# Patient Record
Sex: FEMALE | Race: BLACK OR AFRICAN AMERICAN | Employment: FULL TIME | URBAN - METROPOLITAN AREA
[De-identification: names, ages, dates, MRNs, and addresses within clinical notes are randomized per-mention and may not be internally consistent; named-entity substitution may affect disease eponyms.]

---

## 2017-04-20 ENCOUNTER — HOSPITAL ENCOUNTER (OUTPATIENT)
Dept: LAB | Age: 67
Discharge: HOME | End: 2017-04-20

## 2017-04-20 ENCOUNTER — OFFICE VISIT (OUTPATIENT)
Dept: INTERNAL MEDICINE CLINIC | Age: 67
End: 2017-04-20

## 2017-04-20 VITALS
HEART RATE: 64 BPM | RESPIRATION RATE: 16 BRPM | HEIGHT: 64 IN | WEIGHT: 141.2 LBS | BODY MASS INDEX: 24.11 KG/M2 | SYSTOLIC BLOOD PRESSURE: 122 MMHG | OXYGEN SATURATION: 100 % | TEMPERATURE: 97 F | DIASTOLIC BLOOD PRESSURE: 72 MMHG

## 2017-04-20 DIAGNOSIS — R10.30 LOWER ABDOMINAL PAIN: Primary | ICD-10-CM

## 2017-04-20 PROCEDURE — 99001 SPECIMEN HANDLING PT-LAB: CPT | Performed by: INTERNAL MEDICINE

## 2017-04-20 RX ORDER — BISACODYL 5 MG
5 TABLET, DELAYED RELEASE (ENTERIC COATED) ORAL DAILY PRN
COMMUNITY
End: 2019-04-04

## 2017-04-20 NOTE — PROGRESS NOTES
ROOM # 1    Macho Metcalf presents today for   Chief Complaint   Patient presents with    Abdominal Pain     sore lower abd only in the AM. neg bloody/black tarry stools, or diarrhea. Poss constipation, no urge to have BM.  Nail Problem     fingernail changes, brittle       Joaquin Lofton preferred language for health care discussion is english/other. Is someone accompanying this pt? no    Is the patient using any DME equipment during OV? no    Depression Screening:  PHQ 2 / 9, over the last two weeks 4/20/2017 12/1/2016 7/7/2015 5/13/2014   Little interest or pleasure in doing things Not at all Not at all Not at all Several days   Feeling down, depressed or hopeless Not at all Not at all Not at all Several days   Total Score PHQ 2 0 0 0 2       Learning Assessment:  Learning Assessment 7/7/2015 12/3/2013   PRIMARY LEARNER Patient Patient   HIGHEST LEVEL OF EDUCATION - PRIMARY LEARNER  2 [address] LEARNER NONE -   [address] CAREGIVER No -   PRIMARY LANGUAGE ENGLISH ENGLISH   LEARNER PREFERENCE PRIMARY DEMONSTRATION READING   ANSWERED BY Patient Patient   RELATIONSHIP SELF SELF       Abuse Screening:  Abuse Screening Questionnaire 9/17/2015   Do you ever feel afraid of your partner? N   Are you in a relationship with someone who physically or mentally threatens you? N   Is it safe for you to go home? Y       Fall Risk  Fall Risk Assessment, last 12 mths 4/20/2017 12/1/2016 7/7/2015   Able to walk? Yes Yes Yes   Fall in past 12 months? No No No       Health Maintenance reviewed and discussed per provider. Yes    Macho Metcalf is due for DTAP vax, FOBT, pneumonia vax, medicare yearly exam.  Please order/place referral if appropriate. Advance Directive:  1. Do you have an advance directive in place? Patient Reply: no    2. If not, would you like material regarding how to put one in place? Patient Reply: no    Coordination of Care:  1.  Have you been to the ER, urgent care clinic since your last visit? Hospitalized since your last visit? no    2. Have you seen or consulted any other health care providers outside of the [facility] since your last visit? Include any pap smears or colon screening.  no

## 2017-04-20 NOTE — MR AVS SNAPSHOT
Visit Information Date & Time Provider Department Dept. Phone Encounter #  
 4/20/2017  9:30 AM Betsy Mason MD [address] [phone] [id number] Upcoming Health Maintenance Date Due DTaP/Tdap/Td series (1 - Tdap) 2/10/1971 FOBT Q 1 YEAR AGE 50-75 9/30/2015 MEDICARE YEARLY EXAM 7/7/2016 Pneumococcal 65+ High/Highest Risk (2 of 2 - PCV13) 3/9/2017 GLAUCOMA SCREENING Q2Y 5/20/2017 BREAST CANCER SCRN MAMMOGRAM 4/8/2018 Allergies as of 4/20/2017  Review Complete On: 4/20/2017 By: Betsy Mason MD  
 No Known Allergies Current Immunizations  Reviewed on 3/9/2016 Name Date Pneumococcal Polysaccharide (PPSV-23) 3/9/2016 Not reviewed this visit You Were Diagnosed With   
  
 Codes Comments Lower abdominal pain    -  Primary ICD-10-CM: R10.30 ICD-9-CM: 789.09 Vitals BP Pulse Temp Resp Height(growth percentile) Weight(growth percentile) 122/72 (BP 1 Location: Right arm, BP Patient Position: Sitting) 64 97 °F (36.1 °C) (Oral) 16 5' 4\" (1.626 m) 141 lb 3.2 oz (64 kg) SpO2 BMI OB Status Smoking Status 100% 24.24 kg/m2 Postmenopausal Never Smoker Vitals History BMI and BSA Data Body Mass Index Body Surface Area  
 24.24 kg/m 2 1.7 m 2 Preferred Pharmacy Pharmacy Name Phone [address] [address] [address] [place] [address] [phone] Your Updated Medication List  
  
   
This list is accurate as of: 4/20/17  9:53 AM.  Always use your most recent med list.  
  
  
  
  
 Calcium-Cholecalciferol (D3) 600 mg(1,500mg) -400 unit Cap Take  by mouth. Take one po bid   Indications: new suppliment with calcium, vit d, and zinc  
  
 DULCOLAX (BISACODYL) 5 mg EC tablet Generic drug:  bisacodyl Take 5 mg by mouth daily as needed for Constipation (2 pills daily). To-Do List   
 04/20/2017   Lab:  CBC WITH AUTOMATED DIFF   
  
 04/20/2017 Lab:  METABOLIC PANEL, COMPREHENSIVE   
  
 04/20/2017 Lab:  URINALYSIS W/ RFLX MICROSCOPIC   
  
 04/20/2017 Imaging:  XR ABD FLAT/ ERECT Patient Instructions 1) can use laxative as needed for constipation 2) take multivitamin daily 3) follow-up as needed or sooner if worsening symptoms. Introducing [facility]! [employer] introduces [employer] patient portal. Now you can access parts of your medical record, email your doctor's office, and request medication refills online. 1. In your internet browser, go to [email or web address] 2. Click on the First Time User? Click Here link in the Sign In box. You will see the New Member Sign Up page. 3. Enter your HashParade Access Code exactly as it appears below. You will not need to use this code after youve completed the sign-up process. If you do not sign up before the expiration date, you must request a new code. · HashParade Access Code: [email or web address] Expires: 7/19/2017  9:14 AM 
 
4. Enter the last four digits of your Social Security Number (xxxx) and Date of Birth (mm/dd/yyyy) as indicated and click Submit. You will be taken to the next sign-up page. 5. Create a HashParade ID. This will be your HashParade login ID and cannot be changed, so think of one that is secure and easy to remember. 6. Create a [employer]Parade password. You can change your password at any time. 7. Enter your Password Reset Question and Answer. This can be used at a later time if you forget your password. 8. Enter your e-mail address. You will receive e-mail notification when new information is available in [address] 9. Click Sign Up. You can now view and download portions of your medical record. 10. Click the Download Summary menu link to download a portable copy of your medical information.  
 
If you have questions, please visit the Frequently Asked Questions section of the [employer]. Remember, [employer] is NOT to be used for urgent needs. For medical emergencies, dial 911. Now available from your iPhone and Android! Please provide this summary of care documentation to your next provider. Your primary care clinician is listed as Mariangel Sigala. If you have any questions after today's visit, please call [phone].

## 2017-04-20 NOTE — PROGRESS NOTES
Chief Complaint   Patient presents with    Abdominal Pain     sore lower abd only in the AM. neg bloody/black tarry stools, or diarrhea. Poss constipation, no urge to have BM.  Nail Problem     fingernail changes, brittle       HPI:     Codey Shaikh is a 79 y.o.  female with history of  Dyslipidemia and vitamin D deficiency here for the above complaint. She is having lower abdominal pain  And it is only in the AM. She said it is a soreness. She denies any dysuria, hematuria, increase urgency/frequency. She is not constipated. She said her BM are normal, but 2 weeks ago was like michelle. She is currently taking a laxative. She is eating a lot of fiber and drinking a lot or water. She said it is in the AM when she gets up, but then once the day starts the pain is gone. No nausea or vomiting. No blood or black tarry stools. Brittle fingernails: this has been going on for 3 months. Past Medical History:   Diagnosis Date    Breast cancer [facility]) 2004    left, Dr. Dalton Kidney [facility])     left breast cancer    Fibroid uterus     Other and unspecified hyperlipidemia 9/2014    Vitamin D deficiency 9/2014     Past Surgical History:   Procedure Laterality Date    BREAST SURGERY PROCEDURE UNLISTED  2004    left breast mastectomy, no LN    HX HYSTERECTOMY  1998    TAHBSO    HX OTHER SURGICAL  5/2015    repair on retina hole by Dr. Corine Blankenship     Current Outpatient Prescriptions   Medication Sig    bisacodyl (DULCOLAX, BISACODYL,) 5 mg EC tablet Take 5 mg by mouth daily as needed for Constipation (2 pills daily).  Calcium-Cholecalciferol, D3, 600 mg(1,500mg) -400 unit cap Take  by mouth. Take one po bid   Indications: new suppliment with calcium, vit d, and zinc     No current facility-administered medications for this visit.       Health Maintenance   Topic Date Due    DTaP/Tdap/Td series (1 - Tdap) 02/10/1971    FOBT Q 1 YEAR AGE 50-75  09/30/2015    MEDICARE YEARLY EXAM 07/07/2016    Pneumococcal 65+ High/Highest Risk (2 of 2 - PCV13) 03/09/2017    GLAUCOMA SCREENING Q2Y  05/20/2017    BREAST CANCER SCRN MAMMOGRAM  04/08/2018    Hepatitis C Screening  Addressed    OSTEOPOROSIS SCREENING (DEXA)  Completed    ZOSTER VACCINE AGE 60>  Addressed    INFLUENZA AGE 9 TO ADULT  Addressed     Immunization History   Administered Date(s) Administered    Pneumococcal Polysaccharide (PPSV-23) 03/09/2016     No LMP recorded. Patient is postmenopausal.        Allergies and Intolerances:   No Known Allergies    Family History:   Family History   Problem Relation Age of Onset    Hypertension Mother     Diabetes Father     Diabetes Maternal Grandmother        Social History:   She  reports that she has never smoked. She has never used smokeless tobacco.  She  reports that she does not drink alcohol. ·     OBJECTIVE:   Physical exam:   Visit Vitals    /72 (BP 1 Location: Right arm, BP Patient Position: Sitting)    Pulse 64    Temp 97 °F (36.1 °C) (Oral)    Resp 16    Ht 5' 4\" (1.626 m)    Wt 141 lb 3.2 oz (64 kg)    SpO2 100%    BMI 24.24 kg/m2        Generally: Pleasant female in no acute distress  Cardiac Exam: regular, rate, and rhythm. Normal S1 and S2. No murmurs, gallops, or rubs  Pulmonary exam: Clear to auscultation bilaterally  Abdominal exam: Positive bowel sounds in all four quadrants, soft, nondistended, nontender  Extremities: 2+ dorsalis pedis pulses bilaterally.  No pedal edema    bilaterally  Nails bilaterally of fingers: brittle  LABS/RADIOLOGICAL TESTS:  Lab Results   Component Value Date/Time    WBC 5.8 06/10/2015 11:10 PM    HGB 12.8 06/10/2015 11:10 PM    HCT 40.3 06/10/2015 11:10 PM    PLATELET 086 02/35/5859 11:10 PM     Lab Results   Component Value Date/Time    Sodium 142 06/10/2015 11:10 PM    Potassium 4.3 06/10/2015 11:10 PM    Chloride 107 06/10/2015 11:10 PM    CO2 29 06/10/2015 11:10 PM    Glucose 93 06/10/2015 11:10 PM    BUN 10 06/10/2015 11:10 PM    Creatinine 0.82 06/10/2015 11:10 PM     Lab Results   Component Value Date/Time    Cholesterol, total 191 07/07/2015 09:46 AM    HDL Cholesterol 50 07/07/2015 09:46 AM    LDL, calculated 129 07/07/2015 09:46 AM    Triglyceride 61 07/07/2015 09:46 AM     No results found for: GPT  Previous labs    ASSESSMENT/PLAN:    1. Lower abdominal pain  -     CBC WITH AUTOMATED DIFF; Future  -     METABOLIC PANEL, COMPREHENSIVE; Future  -     URINALYSIS W/ RFLX MICROSCOPIC; Future  -     XR ABD FLAT/ ERECT; Future  -     CBC WITH AUTOMATED DIFF  -     METABOLIC PANEL, COMPREHENSIVE  -     URINALYSIS W/ RFLX MICROSCOPIC    2. Brittle nails: take multivitamin daily    3. Patient verbalized understanding and agreement with the plan. 4. Patient was given an after-visit summary. 5.   Follow-up Disposition:  Return if symptoms worsen or fail to improve. or sooner if worsening symptoms.           Ahmet Perez MD

## 2017-04-20 NOTE — PATIENT INSTRUCTIONS
1) can use laxative as needed for constipation    2) take multivitamin daily       3) follow-up as needed or sooner if worsening symptoms.

## 2017-04-21 ENCOUNTER — TELEPHONE (OUTPATIENT)
Dept: INTERNAL MEDICINE CLINIC | Age: 67
End: 2017-04-21

## 2017-04-21 DIAGNOSIS — R82.90 ABNORMAL URINE: Primary | ICD-10-CM

## 2017-04-21 LAB
ALBUMIN SERPL-MCNC: 4.4 G/DL (ref 3.6–4.8)
ALBUMIN/GLOB SERPL: 1.7 {RATIO} (ref 1.2–2.2)
ALP SERPL-CCNC: 66 IU/L (ref 39–117)
ALT SERPL-CCNC: 7 IU/L (ref 0–32)
APPEARANCE UR: CLEAR
AST SERPL-CCNC: 13 IU/L (ref 0–40)
BACTERIA #/AREA URNS HPF: ABNORMAL /[HPF]
BASOPHILS # BLD AUTO: 0 X10E3/UL (ref 0–0.2)
BASOPHILS NFR BLD AUTO: 0 %
BILIRUB SERPL-MCNC: 0.8 MG/DL (ref 0–1.2)
BILIRUB UR QL STRIP: NEGATIVE
BUN SERPL-MCNC: 12 MG/DL (ref 8–27)
BUN/CREAT SERPL: 17 (ref 12–28)
CALCIUM SERPL-MCNC: 9.2 MG/DL (ref 8.7–10.3)
CASTS URNS QL MICRO: ABNORMAL /LPF
CHLORIDE SERPL-SCNC: 104 MMOL/L (ref 96–106)
CO2 SERPL-SCNC: 27 MMOL/L (ref 18–29)
COLOR UR: YELLOW
CREAT SERPL-MCNC: 0.7 MG/DL (ref 0.57–1)
EOSINOPHIL # BLD AUTO: 0.1 X10E3/UL (ref 0–0.4)
EOSINOPHIL NFR BLD AUTO: 2 %
EPI CELLS #/AREA URNS HPF: ABNORMAL /HPF
ERYTHROCYTE [DISTWIDTH] IN BLOOD BY AUTOMATED COUNT: 15.5 % (ref 12.3–15.4)
GLOBULIN SER CALC-MCNC: 2.6 G/DL (ref 1.5–4.5)
GLUCOSE SERPL-MCNC: 100 MG/DL (ref 65–99)
GLUCOSE UR QL: NEGATIVE
HCT VFR BLD AUTO: 37.5 % (ref 34–46.6)
HGB BLD-MCNC: 12.3 G/DL (ref 11.1–15.9)
HGB UR QL STRIP: NEGATIVE
IMM GRANULOCYTES # BLD: 0 X10E3/UL (ref 0–0.1)
IMM GRANULOCYTES NFR BLD: 0 %
KETONES UR QL STRIP: NEGATIVE
LEUKOCYTE ESTERASE UR QL STRIP: ABNORMAL
LYMPHOCYTES # BLD AUTO: 1.3 X10E3/UL (ref 0.7–3.1)
LYMPHOCYTES NFR BLD AUTO: 27 %
MCH RBC QN AUTO: 25.7 PG (ref 26.6–33)
MCHC RBC AUTO-ENTMCNC: 32.8 G/DL (ref 31.5–35.7)
MCV RBC AUTO: 79 FL (ref 79–97)
MICRO URNS: ABNORMAL
MONOCYTES # BLD AUTO: 0.3 X10E3/UL (ref 0.1–0.9)
MONOCYTES NFR BLD AUTO: 6 %
MUCOUS THREADS URNS QL MICRO: PRESENT
NEUTROPHILS # BLD AUTO: 3.1 X10E3/UL (ref 1.4–7)
NEUTROPHILS NFR BLD AUTO: 65 %
NITRITE UR QL STRIP: NEGATIVE
PH UR STRIP: 5.5 [PH] (ref 5–7.5)
PLATELET # BLD AUTO: 254 X10E3/UL (ref 150–379)
POTASSIUM SERPL-SCNC: 4.4 MMOL/L (ref 3.5–5.2)
PROT SERPL-MCNC: 7 G/DL (ref 6–8.5)
PROT UR QL STRIP: NEGATIVE
RBC # BLD AUTO: 4.78 X10E6/UL (ref 3.77–5.28)
RBC #/AREA URNS HPF: ABNORMAL /HPF
SODIUM SERPL-SCNC: 144 MMOL/L (ref 134–144)
SP GR UR: 1.02 (ref 1–1.03)
UROBILINOGEN UR STRIP-MCNC: 0.2 MG/DL (ref 0.2–1)
WBC # BLD AUTO: 4.8 X10E3/UL (ref 3.4–10.8)
WBC #/AREA URNS HPF: ABNORMAL /HPF

## 2017-04-21 RX ORDER — CIPROFLOXACIN 500 MG/1
500 TABLET ORAL 2 TIMES DAILY
Qty: 6 TAB | Refills: 0 | Status: SHIPPED | OUTPATIENT
Start: 2017-04-21 | End: 2017-04-24

## 2017-04-21 NOTE — TELEPHONE ENCOUNTER
----- Message from Bg Fraser MD sent at 4/21/2017  3:56 PM EDT -----  Please let pt know that labs were normal except:    1) lot's of WBC and few bacteria. This maybe the cause of her lower abdominal pain. Will send electronically cipro 500mg one po bid x  3 days #6 no refills. She will need to come back to give urine ctx. Order in [facility]. 2) AXR negative.

## 2017-04-21 NOTE — TELEPHONE ENCOUNTER
Spoke with patient, confirmed name and . Advised patient of lab results, requested specimen, and ordered medication, per Dr. Dayanna Thompson. Patient verbalized understanding, stated that she will be in tomorrow to leave her sample.      Be advised

## 2017-04-21 NOTE — TELEPHONE ENCOUNTER
Attempted to contact pt at  number, no answer. Lvm for pt to return call to office at [phone]. Will continue to try to contact pt.

## 2017-04-21 NOTE — PROGRESS NOTES
Please let pt know that labs were normal except:    1) lot's of WBC and few bacteria. This maybe the cause of her lower abdominal pain. Will send electronically cipro 500mg one po bid x  3 days #6 no refills. She will need to come back to give urine ctx. Order in [facility] care. 2) AXR negative.

## 2017-04-22 ENCOUNTER — LAB ONLY (OUTPATIENT)
Dept: INTERNAL MEDICINE CLINIC | Age: 67
End: 2017-04-22

## 2017-04-22 DIAGNOSIS — R82.90 ABNORMAL URINE: ICD-10-CM

## 2017-04-24 LAB — BACTERIA UR CULT: NORMAL

## 2017-05-17 ENCOUNTER — OFFICE VISIT (OUTPATIENT)
Dept: INTERNAL MEDICINE CLINIC | Age: 67
End: 2017-05-17

## 2017-05-17 ENCOUNTER — HOSPITAL ENCOUNTER (OUTPATIENT)
Dept: LAB | Age: 67
Discharge: HOME | End: 2017-05-17

## 2017-05-17 VITALS
RESPIRATION RATE: 16 BRPM | DIASTOLIC BLOOD PRESSURE: 70 MMHG | TEMPERATURE: 97.1 F | HEART RATE: 65 BPM | BODY MASS INDEX: 23.93 KG/M2 | WEIGHT: 140.2 LBS | HEIGHT: 64 IN | OXYGEN SATURATION: 99 % | SYSTOLIC BLOOD PRESSURE: 117 MMHG

## 2017-05-17 DIAGNOSIS — R53.1 WEAKNESS: Primary | ICD-10-CM

## 2017-05-17 DIAGNOSIS — Z12.11 COLON CANCER SCREENING: ICD-10-CM

## 2017-05-17 DIAGNOSIS — R30.0 DYSURIA: ICD-10-CM

## 2017-05-17 DIAGNOSIS — Z23 ENCOUNTER FOR IMMUNIZATION: ICD-10-CM

## 2017-05-17 DIAGNOSIS — R11.0 NAUSEA: ICD-10-CM

## 2017-05-17 DIAGNOSIS — Z23 NEED FOR TDAP VACCINATION: ICD-10-CM

## 2017-05-17 DIAGNOSIS — R73.01 ELEVATED FASTING GLUCOSE: ICD-10-CM

## 2017-05-17 LAB
BILIRUB UR QL STRIP: NEGATIVE
GLUCOSE UR-MCNC: NEGATIVE MG/DL
KETONES P FAST UR STRIP-MCNC: NEGATIVE MG/DL
PH UR STRIP: 7 [PH] (ref 4.6–8)
PROT UR QL STRIP: NEGATIVE MG/DL
SP GR UR STRIP: 1.02 (ref 1–1.03)
UA UROBILINOGEN AMB POC: NORMAL (ref 0.2–1)
URINALYSIS CLARITY POC: NORMAL
URINALYSIS COLOR POC: YELLOW
URINE BLOOD POC: NEGATIVE
URINE LEUKOCYTES POC: NORMAL
URINE NITRITES POC: NEGATIVE

## 2017-05-17 PROCEDURE — 99001 SPECIMEN HANDLING PT-LAB: CPT | Performed by: INTERNAL MEDICINE

## 2017-05-17 RX ORDER — CIPROFLOXACIN 500 MG/1
500 TABLET ORAL 2 TIMES DAILY
Qty: 14 TAB | Refills: 0 | Status: SHIPPED | OUTPATIENT
Start: 2017-05-17 | End: 2017-05-24

## 2017-05-17 NOTE — PROGRESS NOTES
Chief Complaint   Patient presents with    Dehydration     [facility] f/u  r/t diarrhea which has since resolved.  Blood sugar problem     urgent care f/u       HPI:     Jennifer Gilliam is a 79 y.o.  female with history of  Breast cancer and dyslipidemia  here for the above complaint. [facility] to [facility] in [address] [place] [address] on 5/12/17 for weakness and diarrhea. They checked her fingerstick sugar and it was 126. She thinks it was from taking a multivitamin on empty stomach. She had 2 episodes when she took multivitamin on empty stomach and diarrhea. She refused do any other work up at that urgent care. Dysuria: This started yesterday and today. She denies any fevers, chills, night sweats, abdominal pain, headaches, dizziness, back pain, hematuria. She has urinary frequency and urgency. Past Medical History:   Diagnosis Date    Breast cancer [facility]) 2004    left, Dr. Nikki Huggins [facility])     left breast cancer    Fibroid uterus     Other and unspecified hyperlipidemia 9/2014    Vitamin D deficiency 9/2014     Past Surgical History:   Procedure Laterality Date    BREAST SURGERY PROCEDURE UNLISTED  2004    left breast mastectomy, no LN    HX HYSTERECTOMY  1998    TAHBSO    HX OTHER SURGICAL  5/2015    repair on retina hole by Dr. Silviano Wright     Current Outpatient Prescriptions   Medication Sig    diph,Pertuss,Acell,,Tet Vac-PF (ADACEL) 2 Lf-(2.5-5-3-5 mcg)-5Lf/0.5 mL susp 0.5 mL by IntraMUSCular route once for 1 dose.  ciprofloxacin HCl (CIPRO) 500 mg tablet Take 1 Tab by mouth two (2) times a day for 7 days.  Calcium-Cholecalciferol, D3, 600 mg(1,500mg) -400 unit cap Take  by mouth. Take one po bid   Indications: new suppliment with calcium, vit d, and zinc    bisacodyl (DULCOLAX, BISACODYL,) 5 mg EC tablet Take 5 mg by mouth daily as needed for Constipation (2 pills daily). No current facility-administered medications for this visit. Health Maintenance   Topic Date Due    FOBT Q 1 YEAR AGE 50-75  09/30/2015    MEDICARE YEARLY EXAM  07/07/2016    GLAUCOMA SCREENING Q2Y  05/20/2017    DTaP/Tdap/Td series (1 - Tdap) 12/22/2017 (Originally 2/10/1971)    INFLUENZA AGE 9 TO ADULT  08/01/2017    BREAST CANCER SCRN MAMMOGRAM  04/08/2018    Hepatitis C Screening  Addressed    OSTEOPOROSIS SCREENING (DEXA)  Completed    ZOSTER VACCINE AGE 60>  Addressed    Pneumococcal 65+ High/Highest Risk  Completed     Immunization History   Administered Date(s) Administered    Pneumococcal Conjugate (PCV-13) 05/17/2017    Pneumococcal Polysaccharide (PPSV-23) 03/09/2016     No LMP recorded. Patient is postmenopausal.        Allergies and Intolerances:   No Known Allergies    Family History:   Family History   Problem Relation Age of Onset    Hypertension Mother     Diabetes Father     Diabetes Maternal Grandmother        Social History:   She  reports that she has never smoked. She has never used smokeless tobacco.  She  reports that she does not drink alcohol. OBJECTIVE:   Physical exam:   Visit Vitals    /70 (BP 1 Location: Right arm, BP Patient Position: Sitting)    Pulse 65    Temp 97.1 °F (36.2 °C) (Oral)    Resp 16    Ht 5' 4\" (1.626 m)    Wt 140 lb 3.2 oz (63.6 kg)    SpO2 99%    BMI 24.07 kg/m2        Generally: Pleasant female in no acute distress  Cardiac Exam: regular, rate, and rhythm. Normal S1 and S2. No murmurs, gallops, or rubs  Pulmonary exam: Clear to auscultation bilaterally  Abdominal exam: Positive bowel sounds in all four quadrants, soft, nondistended, Diffuse abdominal tenderness especially in the lower abdominal area. Extremities: 2+ dorsalis pedis pulses bilaterally.  No pedal edema    bilaterally    LABS/RADIOLOGICAL TESTS:  Lab Results   Component Value Date/Time    WBC 4.8 04/20/2017 09:58 AM    HGB 12.3 04/20/2017 09:58 AM    HCT 37.5 04/20/2017 09:58 AM    PLATELET 133 94/74/0768 09:58 AM Lab Results   Component Value Date/Time    Sodium 144 04/20/2017 09:58 AM    Potassium 4.4 04/20/2017 09:58 AM    Chloride 104 04/20/2017 09:58 AM    CO2 27 04/20/2017 09:58 AM    Glucose 100 04/20/2017 09:58 AM    BUN 12 04/20/2017 09:58 AM    Creatinine 0.70 04/20/2017 09:58 AM     Lab Results   Component Value Date/Time    Cholesterol, total 191 07/07/2015 09:46 AM    HDL Cholesterol 50 07/07/2015 09:46 AM    LDL, calculated 129 07/07/2015 09:46 AM    Triglyceride 61 07/07/2015 09:46 AM     No results found for: GPT    Previous labs  Component      Latest Ref Rng & Units 5/17/2017           8:16 AM   Color (UA POC)       Yellow   Clarity (UA POC)       Cloudy   Glucose (UA POC)      Negative Negative   Bilirubin (UA POC)      Negative Negative   Ketones (UA POC)      Negative Negative   Specific gravity (UA POC)      1.001 - 1.035 1.020   Blood (UA POC)      Negative Negative   pH (UA POC)      4.6 - 8.0 7.0   Protein (UA POC)      Negative mg/dL Negative   Urobilinogen (UA POC)      0.2 - 1 0.2 mg/dL   Nitrites (UA POC)      Negative Negative   Leukocyte esterase (UA POC)      Negative 1+   Pt is notified of results at [address] today. ASSESSMENT/PLAN:    1. Weakness: resolved. 2. Nausea: resolved    3. Elevated fasting glucose: will check HgA1C  -     HEMOGLOBIN A1C WITH EAG; Future  -     HEMOGLOBIN A1C WITH EAG    4. Dysuria  -     AMB POC URINALYSIS DIP STICK AUTO W/O MICRO  -     CULTURE, URINE; Future  -     ciprofloxacin HCl (CIPRO) 500 mg tablet; Take 1 Tab by mouth two (2) times a day for 7 days. -     CULTURE, URINE    5. Encounter for immunization  -     PNEUMOCOCCAL CONJ VACCINE 13 VALENT IM (Age 48 and over)    10. Colon cancer screening  -     REFERRAL TO GASTROENTEROLOGY    7. Need for Tdap vaccination  -     diph,Pertuss,Acell,,Tet Vac-PF (ADACEL) 2 Lf-(2.5-5-3-5 mcg)-5Lf/0.5 mL susp; 0.5 mL by IntraMUSCular route once for 1 dose.     8.   Requested Prescriptions     Signed Prescriptions Disp Refills    diph,Pertuss,Acell,,Tet Vac-PF (ADACEL) 2 Lf-(2.5-5-3-5 mcg)-5Lf/0.5 mL susp 1 Syringe 0     Si.5 mL by IntraMUSCular route once for 1 dose.  ciprofloxacin HCl (CIPRO) 500 mg tablet 14 Tab 0     Sig: Take 1 Tab by mouth two (2) times a day for 7 days. 9. Patient verbalized understanding and agreement with the plan. 10. Patient was given an after-visit summary. 11.   Follow-up Disposition:  Return if symptoms worsen or fail to improve. or sooner if worsening symptoms.           Kirstin Membreno MD

## 2017-05-17 NOTE — MR AVS SNAPSHOT
Visit Information Date & Time Provider Department Dept. Phone Encounter #  
 5/17/2017  7:30 AM Sebastián Ruzi MD [facility] [phone] [id number] Upcoming Health Maintenance Date Due FOBT Q 1 YEAR AGE 50-75 9/30/2015 MEDICARE YEARLY EXAM 7/7/2016 GLAUCOMA SCREENING Q2Y 5/20/2017 DTaP/Tdap/Td series (1 - Tdap) 12/22/2017* INFLUENZA AGE 9 TO ADULT 8/1/2017 BREAST CANCER SCRN MAMMOGRAM 4/8/2018 *Topic was postponed. The date shown is not the original due date. Allergies as of 5/17/2017  Review Complete On: 5/17/2017 By: Sebastián Ruiz MD  
 No Known Allergies Current Immunizations  Reviewed on 5/17/2017 Name Date Pneumococcal Conjugate (PCV-13) 5/17/2017 Pneumococcal Polysaccharide (PPSV-23) 3/9/2016 Reviewed by Cole Em LPN on 8/98/9654 at  8:18 AM  
You Were Diagnosed With   
  
 Codes Comments Weakness    -  Primary ICD-10-CM: R53.1 ICD-9-CM: 780.79 Nausea     ICD-10-CM: R11.0 ICD-9-CM: 787.02 Elevated fasting glucose     ICD-10-CM: R73.01 
ICD-9-CM: 790.21 Dysuria     ICD-10-CM: R30.0 ICD-9-CM: 788.1 Encounter for immunization     ICD-10-CM: P10 ICD-9-CM: V03.89 Colon cancer screening     ICD-10-CM: Z12.11 ICD-9-CM: V76.51 Need for Tdap vaccination     ICD-10-CM: R78 ICD-9-CM: V06.1 Vitals BP Pulse Temp Resp Height(growth percentile) Weight(growth percentile) 117/70 (BP 1 Location: Right arm, BP Patient Position: Sitting) 65 97.1 °F (36.2 °C) (Oral) 16 5' 4\" (1.626 m) 140 lb 3.2 oz (63.6 kg) SpO2 BMI OB Status Smoking Status 99% 24.07 kg/m2 Postmenopausal Never Smoker Vitals History BMI and BSA Data Body Mass Index Body Surface Area 24.07 kg/m 2 1.69 m 2 Preferred Pharmacy Pharmacy Name Phone [address] [facility], [address] [address] [address] [facility] [phone] Your Updated Medication List  
  
 This list is accurate as of: 17  8:19 AM.  Always use your most recent med list.  
  
  
  
  
 Calcium-Cholecalciferol (D3) 600 mg(1,500mg) -400 unit Cap Take  by mouth. Take one po bid   Indications: new suppliment with calcium, vit d, and zinc  
  
 ciprofloxacin HCl 500 mg tablet Commonly known as:  CIPRO Take 1 Tab by mouth two (2) times a day for 7 days. diph,Pertuss(Acell),Tet Vac-PF 2 Lf-(2.5-5-3-5 mcg)-5Lf/0.5 mL susp Commonly known as:  ADACEL  
0.5 mL by IntraMUSCular route once for 1 dose. DULCOLAX (BISACODYL) 5 mg EC tablet Generic drug:  bisacodyl Take 5 mg by mouth daily as needed for Constipation (2 pills daily). Prescriptions Printed Refills diph,Pertuss,Acell,,Tet Vac-PF (ADACEL) 2 Lf-(2.5-5-3-5 mcg)-5Lf/0.5 mL susp 0 Si.5 mL by IntraMUSCular route once for 1 dose. Class: Print Route: IntraMUSCular Prescriptions Sent to Pharmacy Refills  
 ciprofloxacin HCl (CIPRO) 500 mg tablet 0 Sig: Take 1 Tab by mouth two (2) times a day for 7 days. Class: Normal  
 Pharmacy: [address] [place] [address], [address] [place] [address] #: [phone] Route: Oral  
  
We Performed the Following AMB POC URINALYSIS DIP STICK AUTO W/O MICRO [54728 CPT(R)] PNEUMOCOCCAL CONJ VACCINE 13 VALENT IM [id number] CPT(R)] REFERRAL TO GASTROENTEROLOGY [[facility] Custom] Comments:  
 Please evaluate patient for screening colonoscopy in 1-2 weeks. To-Do List   
 2017 Microbiology:  CULTURE, URINE   
  
 2017 Lab:  HEMOGLOBIN A1C WITH EAG Referral Information Referral ID Referred By Referred To  
  
 [id number] [facility] [address] [place] [address], [address] [place] [address] Road Phone: [phone] Fax: [phone] Visits Status Start Date End Date 1 New Request 17 If your referral has a status of pending review or denied, additional information will be sent to support the outcome of this decision. Patient Instructions 1) follow-up as needed or sooner if worsening symptoms. 2) keep hydrated with water. Urinary Tract Infection in Women: Care Instructions Your Care Instructions A urinary tract infection, or UTI, is a general term for an infection anywhere between the kidneys and the urethra (where urine comes out). Most UTIs are bladder infections. They often cause pain or burning when you urinate. UTIs are caused by bacteria and can be cured with antibiotics. Be sure to complete your treatment so that the infection goes away. Follow-up care is a key part of your treatment and safety. Be sure to make and go to all appointments, and call your doctor if you are having problems. It's also a good idea to know your test results and keep a list of the medicines you take. How can you care for yourself at home? · Take your antibiotics as directed. Do not stop taking them just because you feel better. You need to take the full course of antibiotics. · Drink extra water and other fluids for the next day or two. This may help wash out the bacteria that are causing the infection. (If you have kidney, heart, or liver disease and have to limit fluids, talk with your doctor before you increase your fluid intake.) · Avoid drinks that are carbonated or have caffeine. They can irritate the bladder. · Urinate often. Try to empty your bladder each time. · To relieve pain, take a hot bath or lay a heating pad set on low over your lower belly or genital area. Never go to sleep with a heating pad in place. To prevent UTIs · Drink plenty of water each day. This helps you urinate often, which clears bacteria from your system. (If you have kidney, heart, or liver disease and have to limit fluids, talk with your doctor before you increase your fluid intake.) · Urinate when you need to. · Urinate right after you have sex. · Change sanitary pads often. · Avoid douches, bubble baths, feminine hygiene sprays, and other feminine hygiene products that have deodorants. · After going to the bathroom, wipe from front to back. When should you call for help? Call your doctor now or seek immediate medical care if: · Symptoms such as fever, chills, nausea, or vomiting get worse or appear for the first time. · You have new pain in your back just below your rib cage. This is called flank pain. · There is new blood or pus in your urine. · You have any problems with your antibiotic medicine. Watch closely for changes in your health, and be sure to contact your doctor if: 
· You are not getting better after taking an antibiotic for 2 days. · Your symptoms go away but then come back. Where can you learn more? Go to http://servando-grisel.info/. Enter T971 in the search box to learn more about \"Urinary Tract Infection in Women: Care Instructions. \" Current as of: November 28, 2016 Content Version: 11.2 © 2851-4482 [employer]Fire. Care instructions adapted under license by [employer] (which disclaims liability or warranty for this information). If you have questions about a medical condition or this instruction, always ask your healthcare professional. [address] any warranty or liability for your use of this information. Introducing [facility]! [employer] introduces iCrumz patient portal. Now you can access parts of your medical record, email your doctor's office, and request medication refills online. 1. In your internet browser, go to [email or web address] 2. Click on the First Time User? Click Here link in the Sign In box. You will see the New Member Sign Up page. 3. Enter your iCrumz Access Code exactly as it appears below.  You will not need to use this code after youve completed the sign-up process. If you do not sign up before the expiration date, you must request a new code. · [employer] Access Code: [email or web address] Expires: 7/19/2017  9:14 AM 
 
4. Enter the last four digits of your Social Security Number (xxxx) and Date of Birth (mm/dd/yyyy) as indicated and click Submit. You will be taken to the next sign-up page. 5. Create a [employer] ID. This will be your [employer] login ID and cannot be changed, so think of one that is secure and easy to remember. 6. Create a [employer] password. You can change your password at any time. 7. Enter your Password Reset Question and Answer. This can be used at a later time if you forget your password. 8. Enter your e-mail address. You will receive e-mail notification when new information is available in [address] 9. Click Sign Up. You can now view and download portions of your medical record. 10. Click the Download Summary menu link to download a portable copy of your medical information. If you have questions, please visit the Frequently Asked Questions section of the [employer] website. Remember, [employer] is NOT to be used for urgent needs. For medical emergencies, dial 911. Now available from your iPhone and Android! Please provide this summary of care documentation to your next provider. Your primary care clinician is listed as Mariangel Sigala. If you have any questions after today's visit, please call [phone].

## 2017-05-17 NOTE — PATIENT INSTRUCTIONS
1) follow-up as needed or sooner if worsening symptoms. 2) keep hydrated with water. Urinary Tract Infection in Women: Care Instructions  Your Care Instructions    A urinary tract infection, or UTI, is a general term for an infection anywhere between the kidneys and the urethra (where urine comes out). Most UTIs are bladder infections. They often cause pain or burning when you urinate. UTIs are caused by bacteria and can be cured with antibiotics. Be sure to complete your treatment so that the infection goes away. Follow-up care is a key part of your treatment and safety. Be sure to make and go to all appointments, and call your doctor if you are having problems. It's also a good idea to know your test results and keep a list of the medicines you take. How can you care for yourself at home? · Take your antibiotics as directed. Do not stop taking them just because you feel better. You need to take the full course of antibiotics. · Drink extra water and other fluids for the next day or two. This may help wash out the bacteria that are causing the infection. (If you have kidney, heart, or liver disease and have to limit fluids, talk with your doctor before you increase your fluid intake.)  · Avoid drinks that are carbonated or have caffeine. They can irritate the bladder. · Urinate often. Try to empty your bladder each time. · To relieve pain, take a hot bath or lay a heating pad set on low over your lower belly or genital area. Never go to sleep with a heating pad in place. To prevent UTIs  · Drink plenty of water each day. This helps you urinate often, which clears bacteria from your system. (If you have kidney, heart, or liver disease and have to limit fluids, talk with your doctor before you increase your fluid intake.)  · Urinate when you need to. · Urinate right after you have sex. · Change sanitary pads often.   · Avoid douches, bubble baths, feminine hygiene sprays, and other feminine hygiene products that have deodorants. · After going to the bathroom, wipe from front to back. When should you call for help? Call your doctor now or seek immediate medical care if:  · Symptoms such as fever, chills, nausea, or vomiting get worse or appear for the first time. · You have new pain in your back just below your rib cage. This is called flank pain. · There is new blood or pus in your urine. · You have any problems with your antibiotic medicine. Watch closely for changes in your health, and be sure to contact your doctor if:  · You are not getting better after taking an antibiotic for 2 days. · Your symptoms go away but then come back. Where can you learn more? Go to http://servando-grisel.info/. Enter X709 in the search box to learn more about \"Urinary Tract Infection in Women: Care Instructions. \"  Current as of: November 28, 2016  Content Version: 11.2  © 2227-4132 BestSecret.[employer]. Care instructions adapted under license by [employer] (which disclaims liability or warranty for this information). If you have questions about a medical condition or this instruction, always ask your healthcare professional. [address] any warranty or liability for your use of this information.

## 2017-05-17 NOTE — PROGRESS NOTES
ROOM # 3    Shelby Mcdanielite presents today for   Chief Complaint   Patient presents with    Dehydration     [facility] f/u  r/t diarrhea which has since resolved.  Blood sugar problem     urgent care f/u       Shelby Mcdanielite preferred language for health care discussion is english/other. Is someone accompanying this pt? no    Is the patient using any DME equipment during OV? no    Depression Screening:  PHQ over the last two weeks 5/17/2017 4/20/2017 12/1/2016 7/7/2015 5/13/2014   Little interest or pleasure in doing things Not at all Not at all Not at all Not at all Several days   Feeling down, depressed or hopeless Not at all Not at all Not at all Not at all Several days   Total Score PHQ 2 0 0 0 0 2       Learning Assessment:  Learning Assessment 7/7/2015 12/3/2013   PRIMARY LEARNER Patient Patient   HIGHEST LEVEL OF EDUCATION - PRIMARY LEARNER  [address] LEARNER NONE -   [address] CAREGIVER No -   PRIMARY LANGUAGE ENGLISH ENGLISH   LEARNER PREFERENCE PRIMARY DEMONSTRATION READING   ANSWERED BY Patient Patient   RELATIONSHIP SELF SELF       Abuse Screening:  Abuse Screening Questionnaire 9/17/2015   Do you ever feel afraid of your partner? N   Are you in a relationship with someone who physically or mentally threatens you? N   Is it safe for you to go home? Y       Fall Risk  Fall Risk Assessment, last 12 mths 5/17/2017 4/20/2017 12/1/2016 7/7/2015   Able to walk? Yes Yes Yes Yes   Fall in past 12 months? No No No No       Health Maintenance reviewed and discussed per provider. Yes    Shelby Favorite is due for DTAP vax, FOBT, medicare yearly exam, pneumonia vax . Please order/place referral if appropriate. Advance Directive:  1. Do you have an advance directive in place? Patient Reply: no    2. If not, would you like material regarding how to put one in place? Patient Reply: no    Coordination of Care:  1.  Have you been to the ER, urgent care clinic since your last visit? Hospitalized since your last visit? [facility] for hypoglycemia and dehydration r/t diarrhea    2. Have you seen or consulted any other health care providers outside of the [address] [place] [address] since your last visit? Include any pap smears or colon screening.  no

## 2017-05-19 LAB
BACTERIA UR CULT: NORMAL
EST. AVERAGE GLUCOSE BLD GHB EST-MCNC: 111 MG/DL
HBA1C MFR BLD: 5.5 % (ref 4.8–5.6)

## 2017-05-22 DIAGNOSIS — Z12.31 ENCOUNTER FOR SCREENING MAMMOGRAM FOR BREAST CANCER: Primary | ICD-10-CM

## 2017-07-05 ENCOUNTER — ANESTHESIA EVENT (OUTPATIENT)
Dept: ENDOSCOPY | Age: 67
End: 2017-07-05
Payer: COMMERCIAL

## 2017-07-06 ENCOUNTER — HOSPITAL ENCOUNTER (OUTPATIENT)
Age: 67
Setting detail: OUTPATIENT SURGERY
Discharge: HOME | End: 2017-07-06
Attending: INTERNAL MEDICINE | Admitting: INTERNAL MEDICINE
Payer: COMMERCIAL

## 2017-07-06 ENCOUNTER — ANESTHESIA (OUTPATIENT)
Dept: ENDOSCOPY | Age: 67
End: 2017-07-06
Payer: COMMERCIAL

## 2017-07-06 VITALS
RESPIRATION RATE: 16 BRPM | BODY MASS INDEX: 23.31 KG/M2 | HEIGHT: 64 IN | WEIGHT: 136.5 LBS | DIASTOLIC BLOOD PRESSURE: 55 MMHG | HEART RATE: 53 BPM | OXYGEN SATURATION: 100 % | SYSTOLIC BLOOD PRESSURE: 98 MMHG | TEMPERATURE: 97 F

## 2017-07-06 PROBLEM — R10.30 LOWER ABDOMINAL PAIN: Status: ACTIVE | Noted: 2017-07-06

## 2017-07-06 LAB
ATRIAL RATE: 53 BPM
CALCULATED P AXIS, ECG09: 49 DEGREES
CALCULATED R AXIS, ECG10: 5 DEGREES
CALCULATED T AXIS, ECG11: 45 DEGREES
DIAGNOSIS, 93000: NORMAL
P-R INTERVAL, ECG05: 152 MS
Q-T INTERVAL, ECG07: 428 MS
QRS DURATION, ECG06: 82 MS
QTC CALCULATION (BEZET), ECG08: 401 MS
VENTRICULAR RATE, ECG03: 53 BPM

## 2017-07-06 PROCEDURE — 76060000032 HC ANESTHESIA 0.5 TO 1 HR: Performed by: INTERNAL MEDICINE

## 2017-07-06 PROCEDURE — 74011250637 HC RX REV CODE- 250/637: Performed by: NURSE ANESTHETIST, CERTIFIED REGISTERED

## 2017-07-06 PROCEDURE — 77030031670 HC DEV INFL ENDOTEK BIG60 MRTM -B: Performed by: INTERNAL MEDICINE

## 2017-07-06 PROCEDURE — 74011250636 HC RX REV CODE- 250/636: Performed by: NURSE ANESTHETIST, CERTIFIED REGISTERED

## 2017-07-06 PROCEDURE — 76040000007: Performed by: INTERNAL MEDICINE

## 2017-07-06 PROCEDURE — 74011250636 HC RX REV CODE- 250/636

## 2017-07-06 PROCEDURE — 88305 TISSUE EXAM BY PATHOLOGIST: CPT | Performed by: INTERNAL MEDICINE

## 2017-07-06 PROCEDURE — 93005 ELECTROCARDIOGRAM TRACING: CPT

## 2017-07-06 PROCEDURE — 74011000250 HC RX REV CODE- 250

## 2017-07-06 RX ORDER — SODIUM CHLORIDE, SODIUM LACTATE, POTASSIUM CHLORIDE, CALCIUM CHLORIDE 600; 310; 30; 20 MG/100ML; MG/100ML; MG/100ML; MG/100ML
75 INJECTION, SOLUTION INTRAVENOUS CONTINUOUS
Status: DISCONTINUED | OUTPATIENT
Start: 2017-07-07 | End: 2017-07-06 | Stop reason: HOSPADM

## 2017-07-06 RX ORDER — SODIUM CHLORIDE 9 MG/ML
25 INJECTION, SOLUTION INTRAVENOUS CONTINUOUS
Status: CANCELLED | OUTPATIENT
Start: 2017-07-06 | End: 2017-07-06

## 2017-07-06 RX ORDER — FAMOTIDINE 20 MG/1
20 TABLET, FILM COATED ORAL ONCE
Status: COMPLETED | OUTPATIENT
Start: 2017-07-06 | End: 2017-07-06

## 2017-07-06 RX ORDER — SODIUM CHLORIDE 0.9 % (FLUSH) 0.9 %
5-10 SYRINGE (ML) INJECTION AS NEEDED
Status: CANCELLED | OUTPATIENT
Start: 2017-07-06 | End: 2017-07-06

## 2017-07-06 RX ORDER — ONDANSETRON 2 MG/ML
4 INJECTION INTRAMUSCULAR; INTRAVENOUS
Status: CANCELLED | OUTPATIENT
Start: 2017-07-06

## 2017-07-06 RX ORDER — SODIUM CHLORIDE 0.9 % (FLUSH) 0.9 %
5-10 SYRINGE (ML) INJECTION EVERY 8 HOURS
Status: CANCELLED | OUTPATIENT
Start: 2017-07-06 | End: 2017-07-06

## 2017-07-06 RX ORDER — PROPOFOL 10 MG/ML
INJECTION, EMULSION INTRAVENOUS AS NEEDED
Status: DISCONTINUED | OUTPATIENT
Start: 2017-07-06 | End: 2017-07-06 | Stop reason: HOSPADM

## 2017-07-06 RX ORDER — LIDOCAINE HYDROCHLORIDE 20 MG/ML
INJECTION, SOLUTION EPIDURAL; INFILTRATION; INTRACAUDAL; PERINEURAL AS NEEDED
Status: DISCONTINUED | OUTPATIENT
Start: 2017-07-06 | End: 2017-07-06 | Stop reason: HOSPADM

## 2017-07-06 RX ORDER — SODIUM CHLORIDE, SODIUM LACTATE, POTASSIUM CHLORIDE, CALCIUM CHLORIDE 600; 310; 30; 20 MG/100ML; MG/100ML; MG/100ML; MG/100ML
50 INJECTION, SOLUTION INTRAVENOUS CONTINUOUS
Status: CANCELLED | OUTPATIENT
Start: 2017-07-06

## 2017-07-06 RX ADMIN — FAMOTIDINE 20 MG: 20 TABLET ORAL at 10:00

## 2017-07-06 RX ADMIN — PROPOFOL 50 MG: 10 INJECTION, EMULSION INTRAVENOUS at 10:59

## 2017-07-06 RX ADMIN — PROPOFOL 50 MG: 10 INJECTION, EMULSION INTRAVENOUS at 11:03

## 2017-07-06 RX ADMIN — SODIUM CHLORIDE, SODIUM LACTATE, POTASSIUM CHLORIDE, AND CALCIUM CHLORIDE 75 ML/HR: 600; 310; 30; 20 INJECTION, SOLUTION INTRAVENOUS at 09:58

## 2017-07-06 RX ADMIN — PROPOFOL 50 MG: 10 INJECTION, EMULSION INTRAVENOUS at 10:58

## 2017-07-06 RX ADMIN — LIDOCAINE HYDROCHLORIDE 40 MG: 20 INJECTION, SOLUTION EPIDURAL; INFILTRATION; INTRACAUDAL; PERINEURAL at 10:58

## 2017-07-06 NOTE — DISCHARGE INSTRUCTIONS
COLONOSCOPY DISCHARGE INSTRUCTIONS    You have just had an examination of your colon (large intestine). The medication given to you during your procedure will be acting in your body for the next 12 hours, gradually wearing off. Your face may be flushed, skin may be warm and sweaty, you might feel a little bloated or nauseated and sleepy. Please be aware of the followin. For the next 12 hours you SHOULD NOT:    a. Drive, Operate any machinery. b. Drink alcohol.  c. Engage in activities that require mental sharpness or manual dexterity such as cooking. d. Take any medications other than prescribed by a physician.  e. Make any legal or financial decisions. 2. Call this office immediately, if:    a. Onset of new or increase of baldemar rectal bleeding.  b. Fever > 101  c. Increased abdominal pain    Additional instructions:    a. Diet as recommended  b. Due to risk of dehydration after colon prep and sedation, avoid extended periods of time outdoors if the day is hot and humid. c. If biopsies were taken, you will receive a post card or telephone call with results of your biopsies and suggestion for your next colonoscopy. Please allow us at least 3 weeks to get back with you about your results. If we have not contacted you by the, please call us for results. # [phone]  d. If you had polyp(s) removed DO NOT TAKE NSAIDS (Aspirin, Advil, Aleve, Naproxyn, Ibuprofen, etc) for 2 weeks. Tylenol is OK to use. Colonoscopy: What to Expect at [address] [place] [address]  After you have a colonoscopy, you will stay at the clinic for 1 to 2 hours until the medicines wear off. Then you can go home. But you will need to arrange for a ride. Your doctor will tell you when you can eat and do your other usual activities. Your doctor will talk to you about when you will need your next colonoscopy. Your doctor can help you decide how often you need to be checked.  This will depend on the results of your test and your risk for colorectal cancer. After the test, you may be bloated or have gas pains. You may need to pass gas. If a biopsy was done or a polyp was removed, you may have streaks of blood in your stool (feces) for a few days. This care sheet gives you a general idea about how long it will take for you to recover. But each person recovers at a different pace. Follow the steps below to get better as quickly as possible. How can you care for yourself at home? Activity  · Rest when you feel tired. · You can do your normal activities when it feels okay to do so. Diet  · Follow your doctor's directions for eating. · Unless your doctor has told you not to, drink plenty of fluids. This helps to replace the fluids that were lost during the colon prep. · Do not drink alcohol. Medicines  · Your doctor will tell you if and when you can restart your medicines. He or she will also give you instructions about taking any new medicines. · If you take blood thinners, such as warfarin (Coumadin), clopidogrel (Plavix), or aspirin, be sure to talk to your doctor. He or she will tell you if and when to start taking those medicines again. Make sure that you understand exactly what your doctor wants you to do. · If polyps were removed or a biopsy was done during the test, your doctor may tell you not to take aspirin or other anti-inflammatory medicines for a few days. These include ibuprofen (Advil, Motrin) and naproxen (Aleve). Other instructions  · For your safety, do not drive or operate machinery until the medicine wears off and you can think clearly. Your doctor may tell you not to drive or operate machinery until the day after your test.  · Do not sign legal documents or make major decisions until the medicine wears off and you can think clearly. The anesthesia can make it hard for you to fully understand what you are agreeing to. Follow-up care is a key part of your treatment and safety.  Be sure to make and go to all appointments, and call your doctor if you are having problems. It's also a good idea to know your test results and keep a list of the medicines you take. When should you call for help? Call 911 anytime you think you may need emergency care. For example, call if:  · You passed out (lost consciousness). · You pass maroon or bloody stools. · You have severe belly pain. Call your doctor now or seek immediate medical care if:  · Your stools are black and tarlike. · Your stools have streaks of blood, but you did not have a biopsy or any polyps removed. · You have belly pain, or your belly is swollen and firm. · You vomit. · You have a fever. · You are very dizzy. Watch closely for changes in your health, and be sure to contact your doctor if you have any problems. Where can you learn more? Go to http://servando-grisel.info/. Enter E264 in the search box to learn more about \"Colonoscopy: What to Expect at Home. \"  Current as of: August 9, 2016  Content Version: 11.3  © 3201-3412 [employer], [employer]. Care instructions adapted under license by [employer] (which disclaims liability or warranty for this information). If you have questions about a medical condition or this instruction, always ask your healthcare professional. [address] any warranty or liability for your use of this information. Patient armband removed and given to patient to take home.   Patient was informed of the privacy risks if armband lost or stolen

## 2017-07-06 NOTE — ANESTHESIA POSTPROCEDURE EVALUATION
Post-Anesthesia Evaluation & Assessment    Visit Vitals    BP (!) 86/49    Pulse (!) 53    Temp 36.1 °C (97 °F)    Resp 16    Ht 5' 4\" (1.626 m)    Wt 61.9 kg (136 lb 8 oz)    SpO2 100%    BMI 23.43 kg/m2       Nausea/Vomiting: no nausea    Post-operative hydration adequate.     Pain score (VAS): 0    Mental status & Level of consciousness: alert and oriented x 3    Neurological status: moves all extremities, sensation grossly intact    Pulmonary status: airway patent, no supplemental oxygen required    Complications related to anesthesia: none    Additional comments:

## 2017-07-06 NOTE — ANESTHESIA PREPROCEDURE EVALUATION
Anesthetic History   No history of anesthetic complications            Review of Systems / Medical History  Patient summary reviewed and pertinent labs reviewed    Pulmonary  Within defined limits                 Neuro/Psych   Within defined limits           Cardiovascular  Within defined limits                Exercise tolerance: >4 METS     GI/Hepatic/Renal  Within defined limits              Endo/Other  Within defined limits           Other Findings   Comments:   Risk Factors for Postoperative nausea/vomiting:       History of postoperative nausea/vomiting? NO       Female? YES       Motion sickness? NO       Intended opioid administration for postoperative analgesia? NO      Smoking Abstinence  Current Smoker? NO  Elective Surgery? YES  Seen preoperatively by anesthesiologist or proxy prior to day of surgery? YES  Pt abstained from smoking 24 hours prior to anesthesia?  N/A           Physical Exam    Airway  Mallampati: II  TM Distance: 4 - 6 cm  Neck ROM: normal range of motion   Mouth opening: Normal     Cardiovascular  Regular rate and rhythm,  S1 and S2 normal,  no murmur, click, rub, or gallop  Rhythm: regular  Rate: normal         Dental    Dentition: Lower dentition intact and Upper dentition intact     Pulmonary  Breath sounds clear to auscultation               Abdominal  GI exam deferred       Other Findings            Anesthetic Plan    ASA: 2  Anesthesia type: general          Induction: Intravenous  Anesthetic plan and risks discussed with: Patient

## 2017-07-06 NOTE — H&P
Date of Surgery Update:  Brendan Lincoln was seen and examined. History and physical has been reviewed. The patient has been examined.  There have been no significant clinical changes since the completion of the originally dated History and Physical.    Signed By: Mary Bui MD     July 6, 2017 9:23 AM

## 2017-07-06 NOTE — PROCEDURES
Colonoscopy Procedure Note    Patient: Wendi Blankenship MRN: [id number]  SSN: [id number]    YOB: 1950  Age: 79 y.o. Sex: female      Date of Procedure: 7/6/2017      Procedures:  COLONOSCOPY:   HISTORY UPDATE: History and physical has been reviewed. The patient has been examined. There have been no significant clinical changes since the completion of the originally dated History and Physical.   INDICATION:  Lower abdominal pain    PROCEDURE PERFORMED:Colonoscopy was complete to cecum. ENDOSCOPIST: Lidia Espana MD   ASSISTANT:  Endoscopy Technician-1: Aida Norris  Endoscopy RN-1: Delfin Beasley RN   CLASSIFICATION OF PREOPERATIVE RISK: ASA 2 - Patient with mild systemic disease with no functional limitations   ANESTHESIA:  MAC anesthesia   ENDOSCOPE: [id number]                                                                                                        EXTENT OF EXAM: Cecum. QUALITY OF COLON PREPARATION:  good     DESCRIPTION OF PROCEDURE:  The procedure was discussed with the     patient including but not limited to IV conscious sedation, bleeding, perforation and missed polyps and the consent form was signed and witnessed. A safety timeout was performed. Procedure done with MAC. The patient's vitals were monitored at all times, including heart rate and rhythm, oxygen saturation, and blood pressure. The patient was then placed into the left lateral decubitus position. A rectal exam was performed. The Olympus Adult diagnostic colonscope was then passed under direct visualization with ease to the cecum. .  The cecum was identified by landmarks including Ileocecal valve, appendiceal orifice and crow's foot. The scope was then slowly withdrawn while closely visualizing the mucosa in the rectum a retroflection was performed and distal rectum visualized. The scope was then removed.   The patient was transferred to the recovery room in stable condition. FINDINGS:1. Two 2-3 mm sessile polyps in the cecum removed with cold biopsy forceps 2. Mild diverticulosis seen on the right side of the colon 3. Small internal hemorrhoids on retroflexion. EBL: None   SPECIMENS:   ID Type Source Tests Collected by Time Destination   1 : bx polyp Preservative Cecum  Christ Adams MD 7/6/2017 1107 Pathology      IMPRESSION: 1. Two 2-3 mm sessile polyps in the cecum removed with cold biopsy forceps 2. Mild diverticulosis seen on the right side of the colon 3. Small internal hemorrhoids on retroflexion. RECOMMENDATIONS:               1.   Further recommendations will be made based on polyp pathology reuslts                2.  High fibre diet                  Follow Up:   As scheduled.        Reyes Chough, MD   7/6/2017

## 2017-07-06 NOTE — IP AVS SNAPSHOT
[address] [address] Ellie Stormy Mckeon 
[phone] Patient: Douglas Burkitt MRN: [id number] FMM:9/38/1974 You are allergic to the following No active allergies Recent Documentation Height Weight BMI OB Status Smoking Status 1.626 m 61.9 kg 23.43 kg/m2 Postmenopausal Never Smoker Emergency Contacts Name Discharge Info Relation Home Work Mobile Lise Kauffman DISCHARGE CAREGIVER [3] Daughter [21] [phone] About your hospitalization You were admitted on:  July 6, 2017 You last received care in the:  [facility] PHASE 2 RECOVERY You were discharged on:  July 6, 2017 Unit phone number:  [phone] Why you were hospitalized Your primary diagnosis was: Lower Abdominal Pain Providers Seen During Your Hospitalizations Provider Role Specialty Primary office phone Narda Willis MD Attending Provider Gastroenterology [phone] Your Primary Care Physician (PCP) Primary Care Physician Office Phone Office Fax 1500 [facility], [address] [place] [address] Road [phone] Follow-up Information Follow up With Details Comments Contact Info Oralia Orosco MD   [address] [id number] [phone] Marsha Adams MD In 2 months  [address] [place] [address] 
[address] 2520 [facility] [address] [phone] Current Discharge Medication List  
  
CONTINUE these medications which have NOT CHANGED Dose & Instructions Dispensing Information Comments Morning Noon Evening Bedtime Calcium-Cholecalciferol (D3) 600 mg(1,500mg) -400 unit Cap Your last dose was: Your next dose is: Take  by mouth. Take one po bid   Indications: new suppliment with calcium, vit d, and zinc  
 Refills:  0 DULCOLAX (BISACODYL) 5 mg EC tablet Generic drug:  bisacodyl Your last dose was: Your next dose is:    
   
   
 Dose:  5 mg Take 5 mg by mouth daily as needed for Constipation (2 pills daily). Refills:  0 Discharge Instructions COLONOSCOPY DISCHARGE INSTRUCTIONS You have just had an examination of your colon (large intestine). The medication given to you during your procedure will be acting in your body for the next 12 hours, gradually wearing off. Your face may be flushed, skin may be warm and sweaty, you might feel a little bloated or nauseated and sleepy. Please be aware of the followin. For the next 12 hours you SHOULD NOT: 
 
a. Drive, Operate any machinery. b. Drink alcohol. 
c. Engage in activities that require mental sharpness or manual dexterity such as cooking. d. Take any medications other than prescribed by a physician. 
e. Make any legal or financial decisions. 2. Call this office immediately, if: 
 
a. Onset of new or increase of baldemar rectal bleeding. 
b. Fever > 101 
c. Increased abdominal pain Additional instructions: 
 
a. Diet as recommended 
b. Due to risk of dehydration after colon prep and sedation, avoid extended periods of time outdoors if the day is hot and humid. c. If biopsies were taken, you will receive a post card or telephone call with results of your biopsies and suggestion for your next colonoscopy. Please allow us at least 3 weeks to get back with you about your results. If we have not contacted you by the, please call us for results. # [phone] 
d. If you had polyp(s) removed DO NOT TAKE NSAIDS (Aspirin, Advil, Aleve, Naproxyn, Ibuprofen, etc) for 2 weeks. Tylenol is OK to use. Colonoscopy: What to Expect at [facility] Your Recovery After you have a colonoscopy, you will stay at the clinic for 1 to 2 hours until the medicines wear off. Then you can go home.  But you will need to arrange for a ride. Your doctor will tell you when you can eat and do your other usual activities. Your doctor will talk to you about when you will need your next colonoscopy. Your doctor can help you decide how often you need to be checked. This will depend on the results of your test and your risk for colorectal cancer. After the test, you may be bloated or have gas pains. You may need to pass gas. If a biopsy was done or a polyp was removed, you may have streaks of blood in your stool (feces) for a few days. This care sheet gives you a general idea about how long it will take for you to recover. But each person recovers at a different pace. Follow the steps below to get better as quickly as possible. How can you care for yourself at home? Activity · Rest when you feel tired. · You can do your normal activities when it feels okay to do so. Diet · Follow your doctor's directions for eating. · Unless your doctor has told you not to, drink plenty of fluids. This helps to replace the fluids that were lost during the colon prep. · Do not drink alcohol. Medicines · Your doctor will tell you if and when you can restart your medicines. He or she will also give you instructions about taking any new medicines. · If you take blood thinners, such as warfarin (Coumadin), clopidogrel (Plavix), or aspirin, be sure to talk to your doctor. He or she will tell you if and when to start taking those medicines again. Make sure that you understand exactly what your doctor wants you to do. · If polyps were removed or a biopsy was done during the test, your doctor may tell you not to take aspirin or other anti-inflammatory medicines for a few days. These include ibuprofen (Advil, Motrin) and naproxen (Aleve). Other instructions · For your safety, do not drive or operate machinery until the medicine wears off and you can think clearly.  Your doctor may tell you not to drive or operate machinery until the day after your test. 
· Do not sign legal documents or make major decisions until the medicine wears off and you can think clearly. The anesthesia can make it hard for you to fully understand what you are agreeing to. Follow-up care is a key part of your treatment and safety. Be sure to make and go to all appointments, and call your doctor if you are having problems. It's also a good idea to know your test results and keep a list of the medicines you take. When should you call for help? Call 911 anytime you think you may need emergency care. For example, call if: 
· You passed out (lost consciousness). · You pass maroon or bloody stools. · You have severe belly pain. Call your doctor now or seek immediate medical care if: 
· Your stools are black and tarlike. · Your stools have streaks of blood, but you did not have a biopsy or any polyps removed. · You have belly pain, or your belly is swollen and firm. · You vomit. · You have a fever. · You are very dizzy. Watch closely for changes in your health, and be sure to contact your doctor if you have any problems. Where can you learn more? Go to http://servando-grisel.info/. Enter E264 in the search box to learn more about \"Colonoscopy: What to Expect at Home. \" Current as of: August 9, 2016 Content Version: 11.3 © 0754-2238 [employer]. Care instructions adapted under license by [employer] (which disclaims liability or warranty for this information). If you have questions about a medical condition or this instruction, always ask your healthcare professional. [address] [address] any warranty or liability for your use of this information. Patient armband removed and given to patient to take home. Patient was informed of the privacy risks if armband lost or stolen Discharge Orders None Introducing [facility]! [employer] introduces [employer] patient portal. Now you can access parts of your medical record, email your doctor's office, and request medication refills online. 1. In your internet browser, go to [email or web address] 2. Click on the First Time User? Click Here link in the Sign In box. You will see the New Member Sign Up page. 3. Enter your [employer] Access Code exactly as it appears below. You will not need to use this code after youve completed the sign-up process. If you do not sign up before the expiration date, you must request a new code. · [employer] Access Code: [email or web address] Expires: 7/19/2017  9:14 AM 
 
4. Enter the last four digits of your Social Security Number (xxxx) and Date of Birth (mm/dd/yyyy) as indicated and click Submit. You will be taken to the next sign-up page. 5. Create a [employer] ID. This will be your [employer] login ID and cannot be changed, so think of one that is secure and easy to remember. 6. Create a [employer] password. You can change your password at any time. 7. Enter your Password Reset Question and Answer. This can be used at a later time if you forget your password. 8. Enter your e-mail address. You will receive e-mail notification when new information is available in [address] 9. Click Sign Up. You can now view and download portions of your medical record. 10. Click the Download Summary menu link to download a portable copy of your medical information. If you have questions, please visit the Frequently Asked Questions section of the [employer] website. Remember, [employer] is NOT to be used for urgent needs. For medical emergencies, dial 911. Now available from your iPhone and Android! General Information Please provide this summary of care documentation to your next provider. Patient Signature:  ____________________________________________________________ Date:  ____________________________________________________________  
  
Dewane Salen Provider Signature:  ____________________________________________________________ Date:  ____________________________________________________________

## 2017-08-09 ENCOUNTER — OFFICE VISIT (OUTPATIENT)
Dept: INTERNAL MEDICINE CLINIC | Age: 67
End: 2017-08-09

## 2017-08-09 VITALS
HEIGHT: 64 IN | BODY MASS INDEX: 23.52 KG/M2 | HEART RATE: 68 BPM | DIASTOLIC BLOOD PRESSURE: 65 MMHG | OXYGEN SATURATION: 100 % | WEIGHT: 137.8 LBS | RESPIRATION RATE: 16 BRPM | TEMPERATURE: 96.5 F | SYSTOLIC BLOOD PRESSURE: 108 MMHG

## 2017-08-09 DIAGNOSIS — M54.50 ACUTE BILATERAL LOW BACK PAIN WITHOUT SCIATICA: Primary | ICD-10-CM

## 2017-08-09 RX ORDER — LORATADINE 10 MG/1
10 TABLET ORAL
COMMUNITY
End: 2018-05-25 | Stop reason: SDUPTHER

## 2017-08-09 RX ORDER — MELOXICAM 7.5 MG/1
TABLET ORAL
Qty: 60 TAB | Refills: 0 | Status: SHIPPED | OUTPATIENT
Start: 2017-08-09 | End: 2018-05-25

## 2017-08-09 NOTE — PATIENT INSTRUCTIONS
1) use heating pad, icy/hot, tiger balm for pain. 2) Take mobic With food. If you notice any blood/black tarry stools, diarrhea, abdominal pain, nausea, vomiting then stop medication immediately. Give us a call ASAP. 3) follow-up as needed or sooner if worsening symptoms. Back Pain: Care Instructions  Your Care Instructions    Back pain has many possible causes. It is often related to problems with muscles and ligaments of the back. It may also be related to problems with the nerves, discs, or bones of the back. Moving, lifting, standing, sitting, or sleeping in an awkward way can strain the back. Sometimes you don't notice the injury until later. Arthritis is another common cause of back pain. Although it may hurt a lot, back pain usually improves on its own within several weeks. Most people recover in 12 weeks or less. Using good home treatment and being careful not to stress your back can help you feel better sooner. Follow-up care is a key part of your treatment and safety. Be sure to make and go to all appointments, and call your doctor if you are having problems. Its also a good idea to know your test results and keep a list of the medicines you take. How can you care for yourself at home? · Sit or lie in positions that are most comfortable and reduce your pain. Try one of these positions when you lie down:  ¨ Lie on your back with your knees bent and supported by large pillows. ¨ Lie on the floor with your legs on the seat of a sofa or chair. Nikki Francoise on your side with your knees and hips bent and a pillow between your legs. ¨ Lie on your stomach if it does not make pain worse. · Do not sit up in bed, and avoid soft couches and twisted positions. Bed rest can help relieve pain at first, but it delays healing. Avoid bed rest after the first day of back pain. · Change positions every 30 minutes. If you must sit for long periods of time, take breaks from sitting.  Get up and walk around, or lie in a comfortable position. · Try using a heating pad on a low or medium setting for 15 to 20 minutes every 2 or 3 hours. Try a warm shower in place of one session with the heating pad. · You can also try an ice pack for 10 to 15 minutes every 2 to 3 hours. Put a thin cloth between the ice pack and your skin. · Take pain medicines exactly as directed. ¨ If the doctor gave you a prescription medicine for pain, take it as prescribed. ¨ If you are not taking a prescription pain medicine, ask your doctor if you can take an over-the-counter medicine. · Take short walks several times a day. You can start with 5 to 10 minutes, 3 or 4 times a day, and work up to longer walks. Walk on level surfaces and avoid hills and stairs until your back is better. · Return to work and other activities as soon as you can. Continued rest without activity is usually not good for your back. · To prevent future back pain, do exercises to stretch and strengthen your back and stomach. Learn how to use good posture, safe lifting techniques, and proper body mechanics. When should you call for help? Call your doctor now or seek immediate medical care if:  · You have new or worsening numbness in your legs. · You have new or worsening weakness in your legs. (This could make it hard to stand up.)  · You lose control of your bladder or bowels. Watch closely for changes in your health, and be sure to contact your doctor if:  · Your pain gets worse. · You are not getting better after 2 weeks. Where can you learn more? Go to http://servando-grisel.info/. Enter Y755 in the search box to learn more about \"Back Pain: Care Instructions. \"  Current as of: March 21, 2017  Content Version: 11.3  © 1390-0597 [email or web address]. Care instructions adapted under license by [employer] (which disclaims liability or warranty for this information).  If you have questions about a medical condition or this instruction, always ask your healthcare professional. [place] [address] any warranty or liability for your use of this information. Back Stretches: Exercises  Your Care Instructions  Here are some examples of exercises for stretching your back. Start each exercise slowly. Ease off the exercise if you start to have pain. Your doctor or physical therapist will tell you when you can start these exercises and which ones will work best for you. How to do the exercises  Overhead stretch    1. Stand comfortably with your feet shoulder-width apart. 2. Looking straight ahead, raise both arms over your head and reach toward the ceiling. Do not allow your head to tilt back. 3. Hold for 15 to 30 seconds, then lower your arms to your sides. 4. Repeat 2 to 4 times. Side stretch    1. Stand comfortably with your feet shoulder-width apart. 2. Raise one arm over your head, and then lean to the other side. 3. Slide your hand down your leg as you let the weight of your arm gently stretch your side muscles. Hold for 15 to 30 seconds. 4. Repeat 2 to 4 times on each side. Press-up    1. Lie on your stomach, supporting your body with your forearms. 2. Press your elbows down into the floor to raise your upper back. As you do this, relax your stomach muscles and allow your back to arch without using your back muscles. As your press up, do not let your hips or pelvis come off the floor. 3. Hold for 15 to 30 seconds, then relax. 4. Repeat 2 to 4 times. Relax and rest    1. Lie on your back with a rolled towel under your neck and a pillow under your knees. Extend your arms comfortably to your sides. 2. Relax and breathe normally. 3. Remain in this position for about 10 minutes. 4. If you can, do this 2 or 3 times each day. Follow-up care is a key part of your treatment and safety. Be sure to make and go to all appointments, and call your doctor if you are having problems.  It's also a good idea to know your test results and keep a list of the medicines you take. Where can you learn more? Go to http://servando-grisel.info/. Enter V445 in the search box to learn more about \"Back Stretches: Exercises. \"  Current as of: March 21, 2017  Content Version: 11.3  © 7143-7115 [employer]. Care instructions adapted under license by [employer] (which disclaims liability or warranty for this information). If you have questions about a medical condition or this instruction, always ask your healthcare professional. [address] any warranty or liability for your use of this information. Low Back Arthritis: Exercises  Your Care Instructions  Here are some examples of typical rehabilitation exercises for your condition. Start each exercise slowly. Ease off the exercise if you start to have pain. Your doctor or physical therapist will tell you when you can start these exercises and which ones will work best for you. When you are not being active, find a comfortable position for rest. Some people are comfortable on the floor or a medium-firm bed with a small pillow under their head and another under their knees. Some people prefer to lie on their side with a pillow between their knees. Don't stay in one position for too long. Take short walks (10 to 20 minutes) every 2 to 3 hours. Avoid slopes, hills, and stairs until you feel better. Walk only distances you can manage without pain, especially leg pain. How to do the exercises  Pelvic tilt    5. Lie on your back with your knees bent. 6. \"Brace\" your stomachtighten your muscles by pulling in and imagining your belly button moving toward your spine. 7. Press your lower back into the floor. You should feel your hips and pelvis rock back. 8. Hold for 6 seconds while breathing smoothly. 9. Relax and allow your pelvis and hips to rock forward. 10. Repeat 8 to 12 times. Back stretches    5.  Get down on your hands and knees on the floor. 6. Relax your head and allow it to droop. Round your back up toward the ceiling until you feel a nice stretch in your upper, middle, and lower back. Hold this stretch for as long as it feels comfortable, or about 15 to 30 seconds. 7. Return to the starting position with a flat back while you are on your hands and knees. 8. Let your back sway by pressing your stomach toward the floor. Lift your buttocks toward the ceiling. 9. Hold this position for 15 to 30 seconds. 10. Repeat 2 to 4 times. Follow-up care is a key part of your treatment and safety. Be sure to make and go to all appointments, and call your doctor if you are having problems. It's also a good idea to know your test results and keep a list of the medicines you take. Where can you learn more? Go to http://servandoSocialwaregrisel.info/. Enter L981 in the search box to learn more about \"Low Back Arthritis: Exercises. \"  Current as of: March 21, 2017  Content Version: 11.3  © 0294-6376 [employer]. Care instructions adapted under license by [employer] (which disclaims liability or warranty for this information). If you have questions about a medical condition or this instruction, always ask your healthcare professional. [address] any warranty or liability for your use of this information. Low Back Pain: Exercises  Your Care Instructions  Here are some examples of typical rehabilitation exercises for your condition. Start each exercise slowly. Ease off the exercise if you start to have pain. Your doctor or physical therapist will tell you when you can start these exercises and which ones will work best for you. How to do the exercises  Press-up    11. Lie on your stomach, supporting your body with your forearms. 12. Press your elbows down into the floor to raise your upper back.  As you do this, relax your stomach muscles and allow your back to arch without using your back muscles. As your press up, do not let your hips or pelvis come off the floor. 13. Hold for 15 to 30 seconds, then relax. 14. Repeat 2 to 4 times. Alternate arm and leg (bird dog) exercise    Note: Do this exercise slowly. Try to keep your body straight at all times, and do not let one hip drop lower than the other. 11. Start on the floor, on your hands and knees. 12. Tighten your belly muscles. 13. Raise one leg off the floor, and hold it straight out behind you. Be careful not to let your hip drop down, because that will twist your trunk. 14. Hold for about 6 seconds, then lower your leg and switch to the other leg. 15. Repeat 8 to 12 times on each leg. 16. Over time, work up to holding for 10 to 30 seconds each time. 17. If you feel stable and secure with your leg raised, try raising the opposite arm straight out in front of you at the same time. Knee-to-chest exercise    5. Lie on your back with your knees bent and your feet flat on the floor. 6. Bring one knee to your chest, keeping the other foot flat on the floor (or keeping the other leg straight, whichever feels better on your lower back). 7. Keep your lower back pressed to the floor. Hold for at least 15 to 30 seconds. 8. Relax, and lower the knee to the starting position. 9. Repeat with the other leg. Repeat 2 to 4 times with each leg. 10. To get more stretch, put your other leg flat on the floor while pulling your knee to your chest.  Curl-ups    5. Lie on the floor on your back with your knees bent at a 90-degree angle. Your feet should be flat on the floor, about 12 inches from your buttocks. 6. Cross your arms over your chest. If this bothers your neck, try putting your hands behind your neck (not your head), with your elbows spread apart. 7. Slowly tighten your belly muscles and raise your shoulder blades off the floor.   8. Keep your head in line with your body, and do not press your chin to your chest.  9. Hold this position for 1 or 2 seconds, then slowly lower yourself back down to the floor. 10. Repeat 8 to 12 times. Pelvic tilt exercise    1. Lie on your back with your knees bent. 2. \"Brace\" your stomach. This means to tighten your muscles by pulling in and imagining your belly button moving toward your spine. You should feel like your back is pressing to the floor and your hips and pelvis are rocking back. 3. Hold for about 6 seconds while you breathe smoothly. 4. Repeat 8 to 12 times. Heel dig bridging    1. Lie on your back with both knees bent and your ankles bent so that only your heels are digging into the floor. Your knees should be bent about 90 degrees. 2. Then push your heels into the floor, squeeze your buttocks, and lift your hips off the floor until your shoulders, hips, and knees are all in a straight line. 3. Hold for about 6 seconds as you continue to breathe normally, and then slowly lower your hips back down to the floor and rest for up to 10 seconds. 4. Do 8 to 12 repetitions. Hamstring stretch in doorway    1. Lie on your back in a doorway, with one leg through the open door. 2. Slide your leg up the wall to straighten your knee. You should feel a gentle stretch down the back of your leg. 3. Hold the stretch for at least 15 to 30 seconds. Do not arch your back, point your toes, or bend either knee. Keep one heel touching the floor and the other heel touching the wall. 4. Repeat with your other leg. 5. Do 2 to 4 times for each leg. Hip flexor stretch    1. Kneel on the floor with one knee bent and one leg behind you. Place your forward knee over your foot. Keep your other knee touching the floor. 2. Slowly push your hips forward until you feel a stretch in the upper thigh of your rear leg. 3. Hold the stretch for at least 15 to 30 seconds. Repeat with your other leg. 4. Do 2 to 4 times on each side. Wall sit    1. Stand with your back 10 to 12 inches away from a wall.   2. Lean into the wall until your back is flat against it. 3. Slowly slide down until your knees are slightly bent, pressing your lower back into the wall. 4. Hold for about 6 seconds, then slide back up the wall. 5. Repeat 8 to 12 times. Follow-up care is a key part of your treatment and safety. Be sure to make and go to all appointments, and call your doctor if you are having problems. It's also a good idea to know your test results and keep a list of the medicines you take. Where can you learn more? Go to http://servando-grisel.info/. Enter H788 in the search box to learn more about \"Low Back Pain: Exercises. \"  Current as of: March 21, 2017  Content Version: 11.3  © 5666-9277 [employer]. Care instructions adapted under license by [employer] (which disclaims liability or warranty for this information). If you have questions about a medical condition or this instruction, always ask your healthcare professional. [place] [address] any warranty or liability for your use of this information.

## 2017-08-09 NOTE — PROGRESS NOTES
Chief Complaint   Patient presents with    Back Pain     lower back pain c no apparent injury       HPI:     Charles Schlatter is a 79 y.o.  female with history of breast cancer and dyslipidemia   here for the above complaint. She has lower back pain this AM. She says the lower abdominal pain is in the AM. She said she has lower abdominal pain that wakes her up at night, but when she urinates it goes away. The same in the AM, she has pain and then goes away when she urinates. Constant pain. Pain scale: 7/10. It is a dull ache. No trauma. She said it is across the back and no radiation down both legs. No numbness or tingling. She has not tired anything for the pain. She denies any fevers, chills, night sweats, chest pain, shortness of breath, abdominal pain, headaches or dizziness, dysuria, hematuria, increase urgency and frequency. Past Medical History:   Diagnosis Date    Breast cancer [facility]) 2004    left, Dr. Priscilla Golden [facility])     left breast cancer    Fibroid uterus     Other and unspecified hyperlipidemia 9/2014    Vitamin D deficiency 9/2014     Past Surgical History:   Procedure Laterality Date    BREAST SURGERY PROCEDURE UNLISTED  2004    left breast mastectomy, no LN    COLONOSCOPY N/A 7/6/2017    COLONOSCOPY performed by Elaine Garner MD at [address] [place] [address] OTHER SURGICAL  5/2015    repair on retina hole by Dr. Bell Arellano     Current Outpatient Prescriptions   Medication Sig    loratadine (CLARITIN) 10 mg tablet Take 10 mg by mouth.  meloxicam (MOBIC) 7.5 mg tablet Take 1-2 po daily prn pain    bisacodyl (DULCOLAX, BISACODYL,) 5 mg EC tablet Take 5 mg by mouth daily as needed for Constipation (2 pills daily).  Calcium-Cholecalciferol, D3, 600 mg(1,500mg) -400 unit cap Take  by mouth. Take one po bid   Indications: new suppliment with calcium, vit d, and zinc     No current facility-administered medications for this visit. Health Maintenance   Topic Date Due    INFLUENZA AGE 9 TO ADULT  09/15/2017 (Originally 8/1/2017)    DTaP/Tdap/Td series (1 - Tdap) 12/22/2017 (Originally 2/10/1971)    GLAUCOMA SCREENING Q2Y  04/20/2018 (Originally 5/20/2017)    MEDICARE YEARLY EXAM  08/10/2018    BREAST CANCER SCRN MAMMOGRAM  05/17/2019    COLONOSCOPY  07/06/2022    Hepatitis C Screening  Addressed    OSTEOPOROSIS SCREENING (DEXA)  Completed    ZOSTER VACCINE AGE 60>  Addressed    Pneumococcal 65+ High/Highest Risk  Completed     Immunization History   Administered Date(s) Administered    Pneumococcal Conjugate (PCV-13) 05/17/2017    Pneumococcal Polysaccharide (PPSV-23) 03/09/2016     No LMP recorded. Patient is postmenopausal.        Allergies and Intolerances:   No Known Allergies    Family History:   Family History   Problem Relation Age of Onset    Hypertension Mother     Diabetes Father     Diabetes Maternal Grandmother        Social History:   She  reports that she has never smoked. She has never used smokeless tobacco.  She  reports that she does not drink alcohol. ·     OBJECTIVE:   Physical exam:   Visit Vitals    /65 (BP 1 Location: Right arm, BP Patient Position: Sitting)    Pulse 68    Temp 96.5 °F (35.8 °C) (Oral)    Resp 16    Ht 5' 4\" (1.626 m)    Wt 137 lb 12.8 oz (62.5 kg)    SpO2 100%    BMI 23.65 kg/m2        Generally: Pleasant female in no acute distress  Cardiac Exam: regular, rate, and rhythm. Normal S1 and S2. No murmurs, gallops, or rubs  Pulmonary exam: Clear to auscultation bilaterally  Abdominal exam: Positive bowel sounds in all four quadrants, soft, nondistended, nontender  Extremities: 2+ dorsalis pedis pulses bilaterally.  No pedal edema    bilaterally  Low back exam: TTP  Across the lower back area  SLR negative bilaterally  Musculoskeletal exam: 5/5 strength in lower extremities bilaterally  Reflexes: 2/2 in lower extremities bilaterally    LABS/RADIOLOGICAL TESTS:  Lab Results   Component Value Date/Time    WBC 4.8 04/20/2017 09:58 AM    HGB 12.3 04/20/2017 09:58 AM    HCT 37.5 04/20/2017 09:58 AM    PLATELET 841 97/95/5168 09:58 AM     Lab Results   Component Value Date/Time    Sodium 144 04/20/2017 09:58 AM    Potassium 4.4 04/20/2017 09:58 AM    Chloride 104 04/20/2017 09:58 AM    CO2 27 04/20/2017 09:58 AM    Glucose 100 04/20/2017 09:58 AM    BUN 12 04/20/2017 09:58 AM    Creatinine 0.70 04/20/2017 09:58 AM     Lab Results   Component Value Date/Time    Cholesterol, total 191 07/07/2015 09:46 AM    HDL Cholesterol 50 07/07/2015 09:46 AM    LDL, calculated 129 07/07/2015 09:46 AM    Triglyceride 61 07/07/2015 09:46 AM     No results found for: GPT    Previous labs    ASSESSMENT/PLAN:    1. Acute bilateral low back pain without sciatica: think this is arthritis or musculoskeletal. She can use heating pad, icy/hot, tiger balm for pain. Given exercises. Take mobic With food. If you notice any blood/black tarry stools, diarrhea, abdominal pain, nausea, vomiting then stop medication immediately. Give us a call ASAP. She did not not want to get a x-ray at this time. -     meloxicam (MOBIC) 7.5 mg tablet; Take 1-2 po daily prn pain      2. Requested Prescriptions     Signed Prescriptions Disp Refills    meloxicam (MOBIC) 7.5 mg tablet 60 Tab 0     Sig: Take 1-2 po daily prn pain     3. Patient verbalized understanding and agreement with the plan. 4. Patient was given an after-visit summary. 5.   Follow-up Disposition:  Return if symptoms worsen or fail to improve. or sooner if worsening symptoms. Vazquez Cazares MD  Addendum: return to work letter given to return to work on 8/10/17. She cannot get AWE because she has her works insurance and only medicare part A.

## 2017-08-09 NOTE — PROGRESS NOTES
ROOM # 620 [facility] presents today for   Chief Complaint   Patient presents with    Back Pain     lower back pain c no apparent injury       Mae Wei preferred language for health care discussion is english/other. Is someone accompanying this pt? no    Is the patient using any DME equipment during OV? no    Depression Screening:  PHQ over the last two weeks 8/9/2017 5/17/2017 4/20/2017 12/1/2016 7/7/2015 5/13/2014   Little interest or pleasure in doing things Not at all Not at all Not at all Not at all Not at all Several days   Feeling down, depressed or hopeless Not at all Not at all Not at all Not at all Not at all Several days   Total Score PHQ 2 0 0 0 0 0 2       Learning Assessment:  Learning Assessment 7/7/2015 12/3/2013   PRIMARY LEARNER Patient Patient   HIGHEST LEVEL OF EDUCATION - PRIMARY LEARNER  [address] LEARNER NONE -   [address] CAREGIVER No -   PRIMARY LANGUAGE ENGLISH ENGLISH   LEARNER PREFERENCE PRIMARY DEMONSTRATION READING   ANSWERED BY Patient Patient   RELATIONSHIP SELF SELF       Abuse Screening:  Abuse Screening Questionnaire 9/17/2015   Do you ever feel afraid of your partner? N   Are you in a relationship with someone who physically or mentally threatens you? N   Is it safe for you to go home? Y       Fall Risk  Fall Risk Assessment, last 12 mths 8/9/2017 5/17/2017 4/20/2017 12/1/2016 7/7/2015   Able to walk? Yes Yes Yes Yes Yes   Fall in past 12 months? No No No No No       Health Maintenance reviewed and discussed per provider. Yes    Mae Wei is due for glaucoma screening, [facility]. Please order/place referral if appropriate. Advance Directive:  1. Do you have an advance directive in place? Patient Reply: no    2. If not, would you like material regarding how to put one in place? Patient Reply: no    Coordination of Care:  1. Have you been to the ER, urgent care clinic since your last visit? Hospitalized since your last visit? no    2. Have you seen or consulted any other health care providers outside of the [address] [place] [address] since your last visit? Include any pap smears or colon screening.  no

## 2017-08-09 NOTE — LETTER
NOTIFICATION RETURN TO WORK / SCHOOL 
 
8/9/2017 9:15 AM 
 
Ms. Dorian Earl [address] [place] [id number] To Whom It May Concern: 
 
Dorian Earl is currently under the care of Anna Camejo. She will return to work on 8/10/17. If there are questions or concerns please have the patient contact our office. Sincerely, Roseanna Ortez MD

## 2017-08-09 NOTE — MR AVS SNAPSHOT
Visit Information Date & Time Provider Department Dept. Phone Encounter #  
 8/9/2017  8:45 AM Mayco Rios MD [facility] [phone] [id number] Follow-up Instructions Return if symptoms worsen or fail to improve. Upcoming Health Maintenance Date Due INFLUENZA AGE 9 TO ADULT 9/15/2017* DTaP/Tdap/Td series (1 - Tdap) 12/22/2017* GLAUCOMA SCREENING Q2Y 4/20/2018* MEDICARE YEARLY EXAM 8/10/2018 BREAST CANCER SCRN MAMMOGRAM 5/17/2019 COLONOSCOPY 7/6/2022 *Topic was postponed. The date shown is not the original due date. Allergies as of 8/9/2017  Review Complete On: 8/9/2017 By: Nadia Magaña MD  
 No Known Allergies Current Immunizations  Reviewed on 5/17/2017 Name Date Pneumococcal Conjugate (PCV-13) 5/17/2017 Pneumococcal Polysaccharide (PPSV-23) 3/9/2016 Not reviewed this visit You Were Diagnosed With   
  
 Codes Comments Acute bilateral low back pain without sciatica    -  Primary ICD-10-CM: M54.5 ICD-9-CM: 724.2, 338.19 Vitals BP Pulse Temp Resp Height(growth percentile) Weight(growth percentile) 108/65 (BP 1 Location: Right arm, BP Patient Position: Sitting) 68 96.5 °F (35.8 °C) (Oral) 16 5' 4\" (1.626 m) 137 lb 12.8 oz (62.5 kg) SpO2 BMI OB Status Smoking Status 100% 23.65 kg/m2 Postmenopausal Never Smoker Vitals History BMI and BSA Data Body Mass Index Body Surface Area  
 23.65 kg/m 2 1.68 m 2 Preferred Pharmacy Pharmacy Name Phone [address] [address] [address] [facility] [phone] Your Updated Medication List  
  
   
This list is accurate as of: 8/9/17  9:16 AM.  Always use your most recent med list.  
  
  
  
  
 Calcium-Cholecalciferol (D3) 600 mg(1,500mg) -400 unit Cap Take  by mouth. Take one po bid   Indications: new suppliment with calcium, vit d, and zinc  
  
 CLARITIN 10 mg tablet Generic drug:  loratadine Take 10 mg by mouth. DULCOLAX (BISACODYL) 5 mg EC tablet Generic drug:  bisacodyl Take 5 mg by mouth daily as needed for Constipation (2 pills daily). meloxicam 7.5 mg tablet Commonly known as:  MOBIC Take 1-2 po daily prn pain Prescriptions Sent to Pharmacy Refills  
 meloxicam (MOBIC) 7.5 mg tablet 0 Sig: Take 1-2 po daily prn pain  
 Class: Normal  
 Pharmacy: [address] [place] [address] #: [phone] Follow-up Instructions Return if symptoms worsen or fail to improve. Patient Instructions 1) use heating pad, icy/hot, tiger balm for pain. 2) Take mobic With food. If you notice any blood/black tarry stools, diarrhea, abdominal pain, nausea, vomiting then stop medication immediately. Give us a call ASAP. 3) follow-up as needed or sooner if worsening symptoms. Back Pain: Care Instructions Your Care Instructions Back pain has many possible causes. It is often related to problems with muscles and ligaments of the back. It may also be related to problems with the nerves, discs, or bones of the back. Moving, lifting, standing, sitting, or sleeping in an awkward way can strain the back. Sometimes you don't notice the injury until later. Arthritis is another common cause of back pain. Although it may hurt a lot, back pain usually improves on its own within several weeks. Most people recover in 12 weeks or less. Using good home treatment and being careful not to stress your back can help you feel better sooner. Follow-up care is a key part of your treatment and safety. Be sure to make and go to all appointments, and call your doctor if you are having problems. Its also a good idea to know your test results and keep a list of the medicines you take. How can you care for yourself at home? · Sit or lie in positions that are most comfortable and reduce your pain. Try one of these positions when you lie down: ¨ Lie on your back with your knees bent and supported by large pillows. ¨ Lie on the floor with your legs on the seat of a sofa or chair. Verlinda Juma on your side with your knees and hips bent and a pillow between your legs. ¨ Lie on your stomach if it does not make pain worse. · Do not sit up in bed, and avoid soft couches and twisted positions. Bed rest can help relieve pain at first, but it delays healing. Avoid bed rest after the first day of back pain. · Change positions every 30 minutes. If you must sit for long periods of time, take breaks from sitting. Get up and walk around, or lie in a comfortable position. · Try using a heating pad on a low or medium setting for 15 to 20 minutes every 2 or 3 hours. Try a warm shower in place of one session with the heating pad. · You can also try an ice pack for 10 to 15 minutes every 2 to 3 hours. Put a thin cloth between the ice pack and your skin. · Take pain medicines exactly as directed. ¨ If the doctor gave you a prescription medicine for pain, take it as prescribed. ¨ If you are not taking a prescription pain medicine, ask your doctor if you can take an over-the-counter medicine. · Take short walks several times a day. You can start with 5 to 10 minutes, 3 or 4 times a day, and work up to longer walks. Walk on level surfaces and avoid hills and stairs until your back is better. · Return to work and other activities as soon as you can. Continued rest without activity is usually not good for your back. · To prevent future back pain, do exercises to stretch and strengthen your back and stomach. Learn how to use good posture, safe lifting techniques, and proper body mechanics. When should you call for help? Call your doctor now or seek immediate medical care if: 
· You have new or worsening numbness in your legs. · You have new or worsening weakness in your legs. (This could make it hard to stand up.) · You lose control of your bladder or bowels. Watch closely for changes in your health, and be sure to contact your doctor if: 
· Your pain gets worse. · You are not getting better after 2 weeks. Where can you learn more? Go to http://servando-grisel.info/. Enter J506 in the search box to learn more about \"Back Pain: Care Instructions. \" Current as of: March 21, 2017 Content Version: 11.3 © 4608-8903 [employer]. Care instructions adapted under license by [employer] (which disclaims liability or warranty for this information). If you have questions about a medical condition or this instruction, always ask your healthcare professional. [address] any warranty or liability for your use of this information. Back Stretches: Exercises Your Care Instructions Here are some examples of exercises for stretching your back. Start each exercise slowly. Ease off the exercise if you start to have pain. Your doctor or physical therapist will tell you when you can start these exercises and which ones will work best for you. How to do the exercises Overhead stretch 1. Stand comfortably with your feet shoulder-width apart. 2. Looking straight ahead, raise both arms over your head and reach toward the ceiling. Do not allow your head to tilt back. 3. Hold for 15 to 30 seconds, then lower your arms to your sides. 4. Repeat 2 to 4 times. Side stretch 1. Stand comfortably with your feet shoulder-width apart. 2. Raise one arm over your head, and then lean to the other side. 3. Slide your hand down your leg as you let the weight of your arm gently stretch your side muscles. Hold for 15 to 30 seconds. 4. Repeat 2 to 4 times on each side. Press-up 1. Lie on your stomach, supporting your body with your forearms. 2. Press your elbows down into the floor to raise your upper back. As you do this, relax your stomach muscles and allow your back to arch without using your back muscles. As your press up, do not let your hips or pelvis come off the floor. 3. Hold for 15 to 30 seconds, then relax. 4. Repeat 2 to 4 times. Relax and rest 
 
1. Lie on your back with a rolled towel under your neck and a pillow under your knees. Extend your arms comfortably to your sides. 2. Relax and breathe normally. 3. Remain in this position for about 10 minutes. 4. If you can, do this 2 or 3 times each day. Follow-up care is a key part of your treatment and safety. Be sure to make and go to all appointments, and call your doctor if you are having problems. It's also a good idea to know your test results and keep a list of the medicines you take. Where can you learn more? Go to http://servando-grisel.info/. Enter T455 in the search box to learn more about \"Back Stretches: Exercises. \" Current as of: March 21, 2017 Content Version: 11.3 © 6564-4755 [employer]. Care instructions adapted under license by [employer] (which disclaims liability or warranty for this information). If you have questions about a medical condition or this instruction, always ask your healthcare professional. [address] any warranty or liability for your use of this information. Low Back Arthritis: Exercises Your Care Instructions Here are some examples of typical rehabilitation exercises for your condition. Start each exercise slowly. Ease off the exercise if you start to have pain. Your doctor or physical therapist will tell you when you can start these exercises and which ones will work best for you.  
When you are not being active, find a comfortable position for rest. Some people are comfortable on the floor or a medium-firm bed with a small pillow under their head and another under their knees. Some people prefer to lie on their side with a pillow between their knees. Don't stay in one position for too long. Take short walks (10 to 20 minutes) every 2 to 3 hours. Avoid slopes, hills, and stairs until you feel better. Walk only distances you can manage without pain, especially leg pain. How to do the exercises Pelvic tilt 5. Lie on your back with your knees bent. 6. \"Brace\" your stomachtighten your muscles by pulling in and imagining your belly button moving toward your spine. 7. Press your lower back into the floor. You should feel your hips and pelvis rock back. 8. Hold for 6 seconds while breathing smoothly. 9. Relax and allow your pelvis and hips to rock forward. 10. Repeat 8 to 12 times. Back stretches 5. Get down on your hands and knees on the floor. 6. Relax your head and allow it to droop. Round your back up toward the ceiling until you feel a nice stretch in your upper, middle, and lower back. Hold this stretch for as long as it feels comfortable, or about 15 to 30 seconds. 7. Return to the starting position with a flat back while you are on your hands and knees. 8. Let your back sway by pressing your stomach toward the floor. Lift your buttocks toward the ceiling. 9. Hold this position for 15 to 30 seconds. 10. Repeat 2 to 4 times. Follow-up care is a key part of your treatment and safety. Be sure to make and go to all appointments, and call your doctor if you are having problems. It's also a good idea to know your test results and keep a list of the medicines you take. Where can you learn more? Go to http://servando-grisel.info/. Enter W749 in the search box to learn more about \"Low Back Arthritis: Exercises. \" Current as of: March 21, 2017 Content Version: 11.3 © 6414-2871 [employer].  Care instructions adapted under license by [address] (which disclaims liability or warranty for this information). If you have questions about a medical condition or this instruction, always ask your healthcare professional. [address] any warranty or liability for your use of this information. Low Back Pain: Exercises Your Care Instructions Here are some examples of typical rehabilitation exercises for your condition. Start each exercise slowly. Ease off the exercise if you start to have pain. Your doctor or physical therapist will tell you when you can start these exercises and which ones will work best for you. How to do the exercises Press-up 11. Lie on your stomach, supporting your body with your forearms. 12. Press your elbows down into the floor to raise your upper back. As you do this, relax your stomach muscles and allow your back to arch without using your back muscles. As your press up, do not let your hips or pelvis come off the floor. 13. Hold for 15 to 30 seconds, then relax. 14. Repeat 2 to 4 times. Alternate arm and leg (bird dog) exercise Note: Do this exercise slowly. Try to keep your body straight at all times, and do not let one hip drop lower than the other. 11. Start on the floor, on your hands and knees. 12. Tighten your belly muscles. 13. Raise one leg off the floor, and hold it straight out behind you. Be careful not to let your hip drop down, because that will twist your trunk. 14. Hold for about 6 seconds, then lower your leg and switch to the other leg. 15. Repeat 8 to 12 times on each leg. 16. Over time, work up to holding for 10 to 30 seconds each time. 17. If you feel stable and secure with your leg raised, try raising the opposite arm straight out in front of you at the same time. Knee-to-chest exercise 5. Lie on your back with your knees bent and your feet flat on the floor.  
6. Bring one knee to your chest, keeping the other foot flat on the floor (or keeping the other leg straight, whichever feels better on your lower back). 7. Keep your lower back pressed to the floor. Hold for at least 15 to 30 seconds. 8. Relax, and lower the knee to the starting position. 9. Repeat with the other leg. Repeat 2 to 4 times with each leg. 10. To get more stretch, put your other leg flat on the floor while pulling your knee to your chest. 
Curl-ups 5. Lie on the floor on your back with your knees bent at a 90-degree angle. Your feet should be flat on the floor, about 12 inches from your buttocks. 6. Cross your arms over your chest. If this bothers your neck, try putting your hands behind your neck (not your head), with your elbows spread apart. 7. Slowly tighten your belly muscles and raise your shoulder blades off the floor. 8. Keep your head in line with your body, and do not press your chin to your chest. 
9. Hold this position for 1 or 2 seconds, then slowly lower yourself back down to the floor. 10. Repeat 8 to 12 times. Pelvic tilt exercise 1. Lie on your back with your knees bent. 2. \"Brace\" your stomach. This means to tighten your muscles by pulling in and imagining your belly button moving toward your spine. You should feel like your back is pressing to the floor and your hips and pelvis are rocking back. 3. Hold for about 6 seconds while you breathe smoothly. 4. Repeat 8 to 12 times. Heel dig bridging 1. Lie on your back with both knees bent and your ankles bent so that only your heels are digging into the floor. Your knees should be bent about 90 degrees. 2. Then push your heels into the floor, squeeze your buttocks, and lift your hips off the floor until your shoulders, hips, and knees are all in a straight line. 3. Hold for about 6 seconds as you continue to breathe normally, and then slowly lower your hips back down to the floor and rest for up to 10 seconds. 4. Do 8 to 12 repetitions. Hamstring stretch in doorway 1. Lie on your back in a doorway, with one leg through the open door. 2. Slide your leg up the wall to straighten your knee. You should feel a gentle stretch down the back of your leg. 3. Hold the stretch for at least 15 to 30 seconds. Do not arch your back, point your toes, or bend either knee. Keep one heel touching the floor and the other heel touching the wall. 4. Repeat with your other leg. 5. Do 2 to 4 times for each leg. Hip flexor stretch 1. Kneel on the floor with one knee bent and one leg behind you. Place your forward knee over your foot. Keep your other knee touching the floor. 2. Slowly push your hips forward until you feel a stretch in the upper thigh of your rear leg. 3. Hold the stretch for at least 15 to 30 seconds. Repeat with your other leg. 4. Do 2 to 4 times on each side. Wall sit 1. Stand with your back 10 to 12 inches away from a wall. 2. Lean into the wall until your back is flat against it. 3. Slowly slide down until your knees are slightly bent, pressing your lower back into the wall. 4. Hold for about 6 seconds, then slide back up the wall. 5. Repeat 8 to 12 times. Follow-up care is a key part of your treatment and safety. Be sure to make and go to all appointments, and call your doctor if you are having problems. It's also a good idea to know your test results and keep a list of the medicines you take. Where can you learn more? Go to http://servando-grisel.info/. Enter U024 in the search box to learn more about \"Low Back Pain: Exercises. \" Current as of: March 21, 2017 Content Version: 11.3 © 4954-8696 [employer]. Care instructions adapted under license by [employer] (which disclaims liability or warranty for this information).  If you have questions about a medical condition or this instruction, always ask your healthcare professional. [address] [address] any warranty or liability for your use of this information. Introducing [facility]! Meena Lynn introduces RateElert patient portal. Now you can access parts of your medical record, email your doctor's office, and request medication refills online. 1. In your internet browser, go to [email or web address] (Mobile Payments)t 2. Click on the First Time User? Click Here link in the Sign In box. You will see the New Member Sign Up page. 3. Enter your RateElert Access Code exactly as it appears below. You will not need to use this code after youve completed the sign-up process. If you do not sign up before the expiration date, you must request a new code. · RateElert Access Code: [email or web address] Expires: 10/28/2017 12:30 PM 
 
4. Enter the last four digits of your Social Security Number (xxxx) and Date of Birth (mm/dd/yyyy) as indicated and click Submit. You will be taken to the next sign-up page. 5. Create a RateElert ID. This will be your RateElert login ID and cannot be changed, so think of one that is secure and easy to remember. 6. Create a RateElert password. You can change your password at any time. 7. Enter your Password Reset Question and Answer. This can be used at a later time if you forget your password. 8. Enter your e-mail address. You will receive e-mail notification when new information is available in [address] 9. Click Sign Up. You can now view and download portions of your medical record. 10. Click the Download Summary menu link to download a portable copy of your medical information. If you have questions, please visit the Frequently Asked Questions section of the RateElert website. Remember, RateElert is NOT to be used for urgent needs. For medical emergencies, dial 911. Now available from your iPhone and Android! Please provide this summary of care documentation to your next provider. Your primary care clinician is listed as Mariangel Sigala.  If you have any questions after today's visit, please call [phone].

## 2017-12-20 ENCOUNTER — TELEPHONE (OUTPATIENT)
Dept: INTERNAL MEDICINE CLINIC | Age: 67
End: 2017-12-20

## 2017-12-20 NOTE — TELEPHONE ENCOUNTER
Patient called to get new orders for bras and prosthesis for the following location;    [facility]  [address] [place], [place]  [phone]

## 2017-12-20 NOTE — LETTER
12/20/2017 2:39 PM 
 
Ms. Jasson Turcios [address] [place] [address] [phone] RE: Left breast prosthesis & bras To Whom It May Concern: This is an order for Ms. Carmelita Alejandro DOC: 1950 to get left breast prosthesis and bras. She has history of left breast mastectomy for left breast cancer. If you have any questions, please give us a call at [phone]. Sincerely, Isac Mendez M.D.

## 2018-05-25 ENCOUNTER — OFFICE VISIT (OUTPATIENT)
Dept: INTERNAL MEDICINE CLINIC | Age: 68
End: 2018-05-25

## 2018-05-25 VITALS
SYSTOLIC BLOOD PRESSURE: 136 MMHG | DIASTOLIC BLOOD PRESSURE: 81 MMHG | HEART RATE: 59 BPM | TEMPERATURE: 95.9 F | RESPIRATION RATE: 14 BRPM | HEIGHT: 64 IN | OXYGEN SATURATION: 100 % | WEIGHT: 143.6 LBS | BODY MASS INDEX: 24.52 KG/M2

## 2018-05-25 DIAGNOSIS — M54.50 ACUTE BILATERAL LOW BACK PAIN WITHOUT SCIATICA: Primary | ICD-10-CM

## 2018-05-25 DIAGNOSIS — M25.60 JOINT STIFFNESS OF SPINE: ICD-10-CM

## 2018-05-25 RX ORDER — LORATADINE 10 MG/1
10 TABLET ORAL
COMMUNITY
Start: 2018-05-25 | End: 2019-04-04

## 2018-05-25 RX ORDER — MELOXICAM 7.5 MG/1
7.5 TABLET ORAL DAILY
Qty: 30 TAB | Refills: 0 | Status: SHIPPED | OUTPATIENT
Start: 2018-05-25 | End: 2018-12-18

## 2018-05-25 NOTE — PATIENT INSTRUCTIONS
Low Back Pain: Exercises  Your Care Instructions  Here are some examples of typical rehabilitation exercises for your condition. Start each exercise slowly. Ease off the exercise if you start to have pain. Your doctor or physical therapist will tell you when you can start these exercises and which ones will work best for you. How to do the exercises  Press-up    1. Lie on your stomach, supporting your body with your forearms. 2. Press your elbows down into the floor to raise your upper back. As you do this, relax your stomach muscles and allow your back to arch without using your back muscles. As your press up, do not let your hips or pelvis come off the floor. 3. Hold for 15 to 30 seconds, then relax. 4. Repeat 2 to 4 times. Alternate arm and leg (bird dog) exercise    Do this exercise slowly. Try to keep your body straight at all times, and do not let one hip drop lower than the other. 1. Start on the floor, on your hands and knees. 2. Tighten your belly muscles. 3. Raise one leg off the floor, and hold it straight out behind you. Be careful not to let your hip drop down, because that will twist your trunk. 4. Hold for about 6 seconds, then lower your leg and switch to the other leg. 5. Repeat 8 to 12 times on each leg. 6. Over time, work up to holding for 10 to 30 seconds each time. 7. If you feel stable and secure with your leg raised, try raising the opposite arm straight out in front of you at the same time. Knee-to-chest exercise    1. Lie on your back with your knees bent and your feet flat on the floor. 2. Bring one knee to your chest, keeping the other foot flat on the floor (or keeping the other leg straight, whichever feels better on your lower back). 3. Keep your lower back pressed to the floor. Hold for at least 15 to 30 seconds. 4. Relax, and lower the knee to the starting position. 5. Repeat with the other leg. Repeat 2 to 4 times with each leg.   6. To get more stretch, put your other leg flat on the floor while pulling your knee to your chest.  Curl-ups    1. Lie on the floor on your back with your knees bent at a 90-degree angle. Your feet should be flat on the floor, about 12 inches from your buttocks. 2. Cross your arms over your chest. If this bothers your neck, try putting your hands behind your neck (not your head), with your elbows spread apart. 3. Slowly tighten your belly muscles and raise your shoulder blades off the floor. 4. Keep your head in line with your body, and do not press your chin to your chest.  5. Hold this position for 1 or 2 seconds, then slowly lower yourself back down to the floor. 6. Repeat 8 to 12 times. Pelvic tilt exercise    1. Lie on your back with your knees bent. 2. \"Brace\" your stomach. This means to tighten your muscles by pulling in and imagining your belly button moving toward your spine. You should feel like your back is pressing to the floor and your hips and pelvis are rocking back. 3. Hold for about 6 seconds while you breathe smoothly. 4. Repeat 8 to 12 times. Heel dig bridging    1. Lie on your back with both knees bent and your ankles bent so that only your heels are digging into the floor. Your knees should be bent about 90 degrees. 2. Then push your heels into the floor, squeeze your buttocks, and lift your hips off the floor until your shoulders, hips, and knees are all in a straight line. 3. Hold for about 6 seconds as you continue to breathe normally, and then slowly lower your hips back down to the floor and rest for up to 10 seconds. 4. Do 8 to 12 repetitions. Hamstring stretch in doorway    1. Lie on your back in a doorway, with one leg through the open door. 2. Slide your leg up the wall to straighten your knee. You should feel a gentle stretch down the back of your leg. 3. Hold the stretch for at least 15 to 30 seconds. Do not arch your back, point your toes, or bend either knee.  Keep one heel touching the floor and the other heel touching the wall. 4. Repeat with your other leg. 5. Do 2 to 4 times for each leg. Hip flexor stretch    1. Kneel on the floor with one knee bent and one leg behind you. Place your forward knee over your foot. Keep your other knee touching the floor. 2. Slowly push your hips forward until you feel a stretch in the upper thigh of your rear leg. 3. Hold the stretch for at least 15 to 30 seconds. Repeat with your other leg. 4. Do 2 to 4 times on each side. Wall sit    1. Stand with your back 10 to 12 inches away from a wall. 2. Lean into the wall until your back is flat against it. 3. Slowly slide down until your knees are slightly bent, pressing your lower back into the wall. 4. Hold for about 6 seconds, then slide back up the wall. 5. Repeat 8 to 12 times. Follow-up care is a key part of your treatment and safety. Be sure to make and go to all appointments, and call your doctor if you are having problems. It's also a good idea to know your test results and keep a list of the medicines you take. Where can you learn more? Go to http://servando-grisel.info/. Enter B796 in the search box to learn more about \"Low Back Pain: Exercises. \"  Current as of: March 21, 2017  Content Version: 11.4  © 4782-1498 [employer]. Care instructions adapted under license by [employer] (which disclaims liability or warranty for this information). If you have questions about a medical condition or this instruction, always ask your healthcare professional. [place] [address] any warranty or liability for your use of this information. Back Stretches: Exercises  Your Care Instructions  Here are some examples of exercises for stretching your back. Start each exercise slowly. Ease off the exercise if you start to have pain. Your doctor or physical therapist will tell you when you can start these exercises and which ones will work best for you.   How to do the exercises  Overhead stretch    5. Stand comfortably with your feet shoulder-width apart. 6. Looking straight ahead, raise both arms over your head and reach toward the ceiling. Do not allow your head to tilt back. 7. Hold for 15 to 30 seconds, then lower your arms to your sides. 8. Repeat 2 to 4 times. Side stretch    8. Stand comfortably with your feet shoulder-width apart. 9. Raise one arm over your head, and then lean to the other side. 10. Slide your hand down your leg as you let the weight of your arm gently stretch your side muscles. Hold for 15 to 30 seconds. 11. Repeat 2 to 4 times on each side. Press-up    7. Lie on your stomach, supporting your body with your forearms. 8. Press your elbows down into the floor to raise your upper back. As you do this, relax your stomach muscles and allow your back to arch without using your back muscles. As your press up, do not let your hips or pelvis come off the floor. 9. Hold for 15 to 30 seconds, then relax. 10. Repeat 2 to 4 times. Relax and rest    7. Lie on your back with a rolled towel under your neck and a pillow under your knees. Extend your arms comfortably to your sides. 8. Relax and breathe normally. 9. Remain in this position for about 10 minutes. 10. If you can, do this 2 or 3 times each day. Follow-up care is a key part of your treatment and safety. Be sure to make and go to all appointments, and call your doctor if you are having problems. It's also a good idea to know your test results and keep a list of the medicines you take. Where can you learn more? Go to http://servando-grisel.info/. Enter V678 in the search box to learn more about \"Back Stretches: Exercises. \"  Current as of: March 21, 2017  Content Version: 11.4  © 2415-0459 [employer]wise, [employer]. Care instructions adapted under license by [employer] (which disclaims liability or warranty for this information).  If you have questions about a medical condition or this instruction, always ask your healthcare professional. [address] any warranty or liability for your use of this information. Learning About Relief for Back Pain  What is back tension and strain? Back strain happens when you overstretch, or pull, a muscle in your back. You may hurt your back in an accident or when you exercise or lift something. Most back pain will get better with rest and time. You can take care of yourself at home to help your back heal.  What can you do first to relieve back pain? When you first feel back pain, try these steps:  · Walk. Take a short walk (10 to 20 minutes) on a level surface (no slopes, hills, or stairs) every 2 to 3 hours. Walk only distances you can manage without pain, especially leg pain. · Relax. Find a comfortable position for rest. Some people are comfortable on the floor or a medium-firm bed with a small pillow under their head and another under their knees. Some people prefer to lie on their side with a pillow between their knees. Don't stay in one position for too long. · Try heat or ice. Try using a heating pad on a low or medium setting, or take a warm shower, for 15 to 20 minutes every 2 to 3 hours. Or you can buy single-use heat wraps that last up to 8 hours. You can also try an ice pack for 10 to 15 minutes every 2 to 3 hours. You can use an ice pack or a bag of frozen vegetables wrapped in a thin towel. There is not strong evidence that either heat or ice will help, but you can try them to see if they help. You may also want to try switching between heat and cold. · Take pain medicine exactly as directed. ¨ If the doctor gave you a prescription medicine for pain, take it as prescribed. ¨ If you are not taking a prescription pain medicine, ask your doctor if you can take an over-the-counter medicine. What else can you do? · Stretch and exercise.  Exercises that increase flexibility may relieve your pain and make it easier for your muscles to keep your spine in a good, neutral position. And don't forget to keep walking. · Do self-massage. You can use self-massage to unwind after work or school or to energize yourself in the morning. You can easily massage your feet, hands, or neck. Self-massage works best if you are in comfortable clothes and are sitting or lying in a comfortable position. Use oil or lotion to massage bare skin. · Reduce stress. Back pain can lead to a vicious [place]: Distress about the pain tenses the muscles in your back, which in turn causes more pain. Learn how to relax your mind and your muscles to lower your stress. Where can you learn more? Go to http://servando-grisel.info/. Enter I971 in the search box to learn more about \"Learning About Relief for Back Pain. \"  Current as of: March 21, 2017  Content Version: 11.4  © 0113-0945 [employer]. Care instructions adapted under license by [email or web address] (which disclaims liability or warranty for this information). If you have questions about a medical condition or this instruction, always ask your healthcare professional. [address] any warranty or liability for your use of this information. Use heating pad to low back to help mobility. Take medicine as needed to relieve back pain. Continue stretches and exercise to prevent back pain and stiffness.

## 2018-05-25 NOTE — MR AVS SNAPSHOT
[facility] 
 
 
 [address] [facility] [address] 
[phone] Patient: Puneet Hernandez MRN: [id number] [facility]:[birth year] Visit Information Date & Time Provider Department Dept. Phone Encounter #  
 5/25/2018  7:30 AM Krystal Mitchell NP [facility] [phone] [id number] Follow-up Instructions Return if symptoms worsen or fail to improve. Upcoming Health Maintenance Date Due DTaP/Tdap/Td series (1 - Tdap) 2/10/1971 Influenza Age 5 to Adult 8/1/2018 MEDICARE YEARLY EXAM 8/10/2018 GLAUCOMA SCREENING Q2Y 12/27/2018 BREAST CANCER SCRN MAMMOGRAM 5/17/2019 COLONOSCOPY 7/6/2022 Allergies as of 5/25/2018  Review Complete On: 5/25/2018 By: Krystal Mitchell NP No Known Allergies Current Immunizations  Reviewed on 5/17/2017 Name Date Pneumococcal Conjugate (PCV-13) 5/17/2017 Pneumococcal Polysaccharide (PPSV-23) 3/9/2016 Not reviewed this visit You Were Diagnosed With   
  
 Codes Comments Acute bilateral low back pain without sciatica    -  Primary ICD-10-CM: M54.5 ICD-9-CM: 724.2, 338.19   
 Joint stiffness of spine     ICD-10-CM: M25.60 ICD-9-CM: 719.58 Vitals BP Pulse Temp Resp Weight(growth percentile) SpO2  
 135/81 (BP 1 Location: Right arm, BP Patient Position: Sitting) (!) 59 95.9 °F (35.5 °C) (Oral) 14 143 lb 9.6 oz (65.1 kg) 100% BMI OB Status Smoking Status 24.65 kg/m2 Postmenopausal Never Smoker Vitals History BMI and BSA Data Body Mass Index Body Surface Area  
 24.65 kg/m 2 1.71 m 2 Preferred Pharmacy Pharmacy Name Phone [facility] [address] [phone] Your Updated Medication List  
  
   
This list is accurate as of 5/25/18  7:56 AM.  Always use your most recent med list.  
  
  
  
  
 Calcium-Cholecalciferol (D3) 600 mg(1,500mg) -400 unit Cap Take  by mouth. Take one po bid   Indications: new suppliment with calcium, vit d, and zinc  
  
 CLARITIN 10 mg tablet Generic drug:  loratadine Take 10 mg by mouth. DULCOLAX (BISACODYL) 5 mg EC tablet Generic drug:  bisacodyl Take 5 mg by mouth daily as needed for Constipation (2 pills daily). meloxicam 7.5 mg tablet Commonly known as:  MOBIC Take 1 Tab by mouth daily. Prescriptions Sent to Pharmacy Refills  
 meloxicam (MOBIC) 7.5 mg tablet 0 Sig: Take 1 Tab by mouth daily. Class: Normal  
 Pharmacy: [id number] [address] [place] [address] #: [phone] Route: Oral  
  
Follow-up Instructions Return if symptoms worsen or fail to improve. Patient Instructions Low Back Pain: Exercises Your Care Instructions Here are some examples of typical rehabilitation exercises for your condition. Start each exercise slowly. Ease off the exercise if you start to have pain. Your doctor or physical therapist will tell you when you can start these exercises and which ones will work best for you. How to do the exercises Press-up 1. Lie on your stomach, supporting your body with your forearms. 2. Press your elbows down into the floor to raise your upper back. As you do this, relax your stomach muscles and allow your back to arch without using your back muscles. As your press up, do not let your hips or pelvis come off the floor. 3. Hold for 15 to 30 seconds, then relax. 4. Repeat 2 to 4 times. Alternate arm and leg (bird dog) exercise Do this exercise slowly. Try to keep your body straight at all times, and do not let one hip drop lower than the other. 1. Start on the floor, on your hands and knees. 2. Tighten your belly muscles. 3. Raise one leg off the floor, and hold it straight out behind you. Be careful not to let your hip drop down, because that will twist your trunk. 4. Hold for about 6 seconds, then lower your leg and switch to the other leg. 5. Repeat 8 to 12 times on each leg. 6. Over time, work up to holding for 10 to 30 seconds each time. 7. If you feel stable and secure with your leg raised, try raising the opposite arm straight out in front of you at the same time. Knee-to-chest exercise 1. Lie on your back with your knees bent and your feet flat on the floor. 2. Bring one knee to your chest, keeping the other foot flat on the floor (or keeping the other leg straight, whichever feels better on your lower back). 3. Keep your lower back pressed to the floor. Hold for at least 15 to 30 seconds. 4. Relax, and lower the knee to the starting position. 5. Repeat with the other leg. Repeat 2 to 4 times with each leg. 6. To get more stretch, put your other leg flat on the floor while pulling your knee to your chest. 
Curl-ups 1. Lie on the floor on your back with your knees bent at a 90-degree angle. Your feet should be flat on the floor, about 12 inches from your buttocks. 2. Cross your arms over your chest. If this bothers your neck, try putting your hands behind your neck (not your head), with your elbows spread apart. 3. Slowly tighten your belly muscles and raise your shoulder blades off the floor. 4. Keep your head in line with your body, and do not press your chin to your chest. 
5. Hold this position for 1 or 2 seconds, then slowly lower yourself back down to the floor. 6. Repeat 8 to 12 times. Pelvic tilt exercise 1. Lie on your back with your knees bent. 2. \"Brace\" your stomach. This means to tighten your muscles by pulling in and imagining your belly button moving toward your spine. You should feel like your back is pressing to the floor and your hips and pelvis are rocking back. 3. Hold for about 6 seconds while you breathe smoothly. 4. Repeat 8 to 12 times. Heel dig bridging 1. Lie on your back with both knees bent and your ankles bent so that only your heels are digging into the floor. Your knees should be bent about 90 degrees. 2. Then push your heels into the floor, squeeze your buttocks, and lift your hips off the floor until your shoulders, hips, and knees are all in a straight line. 3. Hold for about 6 seconds as you continue to breathe normally, and then slowly lower your hips back down to the floor and rest for up to 10 seconds. 4. Do 8 to 12 repetitions. Hamstring stretch in doorway 1. Lie on your back in a doorway, with one leg through the open door. 2. Slide your leg up the wall to straighten your knee. You should feel a gentle stretch down the back of your leg. 3. Hold the stretch for at least 15 to 30 seconds. Do not arch your back, point your toes, or bend either knee. Keep one heel touching the floor and the other heel touching the wall. 4. Repeat with your other leg. 5. Do 2 to 4 times for each leg. Hip flexor stretch 1. Kneel on the floor with one knee bent and one leg behind you. Place your forward knee over your foot. Keep your other knee touching the floor. 2. Slowly push your hips forward until you feel a stretch in the upper thigh of your rear leg. 3. Hold the stretch for at least 15 to 30 seconds. Repeat with your other leg. 4. Do 2 to 4 times on each side. Wall sit 1. Stand with your back 10 to 12 inches away from a wall. 2. Lean into the wall until your back is flat against it. 3. Slowly slide down until your knees are slightly bent, pressing your lower back into the wall. 4. Hold for about 6 seconds, then slide back up the wall. 5. Repeat 8 to 12 times. Follow-up care is a key part of your treatment and safety. Be sure to make and go to all appointments, and call your doctor if you are having problems. It's also a good idea to know your test results and keep a list of the medicines you take. Where can you learn more? Go to http://servando-grisel.info/. Enter Y574 in the search box to learn more about \"Low Back Pain: Exercises. \" Current as of: March 21, 2017 Content Version: 11.4 © 3778-1169 [employer]. Care instructions adapted under license by [employer] (which disclaims liability or warranty for this information). If you have questions about a medical condition or this instruction, always ask your healthcare professional. [address] any warranty or liability for your use of this information. Back Stretches: Exercises Your Care Instructions Here are some examples of exercises for stretching your back. Start each exercise slowly. Ease off the exercise if you start to have pain. Your doctor or physical therapist will tell you when you can start these exercises and which ones will work best for you. How to do the exercises Overhead stretch 5. Stand comfortably with your feet shoulder-width apart. 6. Looking straight ahead, raise both arms over your head and reach toward the ceiling. Do not allow your head to tilt back. 7. Hold for 15 to 30 seconds, then lower your arms to your sides. 8. Repeat 2 to 4 times. Side stretch 8. Stand comfortably with your feet shoulder-width apart. 9. Raise one arm over your head, and then lean to the other side. 10. Slide your hand down your leg as you let the weight of your arm gently stretch your side muscles. Hold for 15 to 30 seconds. 11. Repeat 2 to 4 times on each side. Press-up 7. Lie on your stomach, supporting your body with your forearms. 8. Press your elbows down into the floor to raise your upper back. As you do this, relax your stomach muscles and allow your back to arch without using your back muscles. As your press up, do not let your hips or pelvis come off the floor. 9. Hold for 15 to 30 seconds, then relax. 10. Repeat 2 to 4 times.  
Relax and rest 
 
 7. Lie on your back with a rolled towel under your neck and a pillow under your knees. Extend your arms comfortably to your sides. 8. Relax and breathe normally. 9. Remain in this position for about 10 minutes. 10. If you can, do this 2 or 3 times each day. Follow-up care is a key part of your treatment and safety. Be sure to make and go to all appointments, and call your doctor if you are having problems. It's also a good idea to know your test results and keep a list of the medicines you take. Where can you learn more? Go to http://servandoHealth Warriorgrisel.info/. Enter K947 in the search box to learn more about \"Back Stretches: Exercises. \" Current as of: March 21, 2017 Content Version: 11.4 © 9742-4628 [employer]. Care instructions adapted under license by [employer] (which disclaims liability or warranty for this information). If you have questions about a medical condition or this instruction, always ask your healthcare professional. [place] [address] any warranty or liability for your use of this information. Learning About Relief for Back Pain What is back tension and strain? Back strain happens when you overstretch, or pull, a muscle in your back. You may hurt your back in an accident or when you exercise or lift something. Most back pain will get better with rest and time. You can take care of yourself at home to help your back heal. 
What can you do first to relieve back pain? When you first feel back pain, try these steps: 
· Walk. Take a short walk (10 to 20 minutes) on a level surface (no slopes, hills, or stairs) every 2 to 3 hours. Walk only distances you can manage without pain, especially leg pain. · Relax. Find a comfortable position for rest. Some people are comfortable on the floor or a medium-firm bed with a small pillow under their head and another under their knees.  Some people prefer to lie on their side with a pillow between their knees. Don't stay in one position for too long. · Try heat or ice. Try using a heating pad on a low or medium setting, or take a warm shower, for 15 to 20 minutes every 2 to 3 hours. Or you can buy single-use heat wraps that last up to 8 hours. You can also try an ice pack for 10 to 15 minutes every 2 to 3 hours. You can use an ice pack or a bag of frozen vegetables wrapped in a thin towel. There is not strong evidence that either heat or ice will help, but you can try them to see if they help. You may also want to try switching between heat and cold. · Take pain medicine exactly as directed. ¨ If the doctor gave you a prescription medicine for pain, take it as prescribed. ¨ If you are not taking a prescription pain medicine, ask your doctor if you can take an over-the-counter medicine. What else can you do? · Stretch and exercise. Exercises that increase flexibility may relieve your pain and make it easier for your muscles to keep your spine in a good, neutral position. And don't forget to keep walking. · Do self-massage. You can use self-massage to unwind after work or school or to energize yourself in the morning. You can easily massage your feet, hands, or neck. Self-massage works best if you are in comfortable clothes and are sitting or lying in a comfortable position. Use oil or lotion to massage bare skin. · Reduce stress. Back pain can lead to a vicious [place]: Distress about the pain tenses the muscles in your back, which in turn causes more pain. Learn how to relax your mind and your muscles to lower your stress. Where can you learn more? Go to http://servando-grisel.info/. Enter T585 in the search box to learn more about \"Learning About Relief for Back Pain. \" Current as of: March 21, 2017 Content Version: 11.4 © 4576-0231 [employer].  Care instructions adapted under license by [employer] (which disclaims liability or warranty for this information). If you have questions about a medical condition or this instruction, always ask your healthcare professional. [address] any warranty or liability for your use of this information. Use heating pad to low back to help mobility. Take medicine as needed to relieve back pain. Continue stretches and exercise to prevent back pain and stiffness. Introducing [facility]! [employer] introduces Taptu patient portal. Now you can access parts of your medical record, email your doctor's office, and request medication refills online. 1. In your internet browser, go to [email or web address] 2. Click on the First Time User? Click Here link in the Sign In box. You will see the New Member Sign Up page. 3. Enter your Taptu Access Code exactly as it appears below. You will not need to use this code after youve completed the sign-up process. If you do not sign up before the expiration date, you must request a new code. · Taptu Access Code: [email or web address] Expires: 8/23/2018  7:56 AM 
 
4. Enter the last four digits of your Social Security Number (xxxx) and Date of Birth (mm/dd/yyyy) as indicated and click Submit. You will be taken to the next sign-up page. 5. Create a Taptu ID. This will be your Taptu login ID and cannot be changed, so think of one that is secure and easy to remember. 6. Create a Taptu password. You can change your password at any time. 7. Enter your Password Reset Question and Answer. This can be used at a later time if you forget your password. 8. Enter your e-mail address. You will receive e-mail notification when new information is available in [address] 9. Click Sign Up. You can now view and download portions of your medical record. 10. Click the Download Summary menu link to download a portable copy of your medical information. If you have questions, please visit the Frequently Asked Questions section of the [employer] website. Remember, [employer] is NOT to be used for urgent needs. For medical emergencies, dial 911. Now available from your iPhone and Android! Please provide this summary of care documentation to your next provider. Your primary care clinician is listed as Mariangel Sigala. If you have any questions after today's visit, please call [phone].

## 2018-05-25 NOTE — PROGRESS NOTES
HISTORY OF PRESENT ILLNESS  Rosio Meza is a 76 y.o. female. HPI Comments: Patient presents today c/o LBP, onset last week while getting up from chair, with radiating pain to R thigh, relieved after 1 day with Shahab. Pain returned yesterday after getting up in the morning, dull ache in transverse low back, without radiating pain. Pt took another [address]in[address] with only mild relief. Pt relates pain to chair she usually sits in for periods of time but has tried to switch to another chair without much improvement. Pt concerned as FH mother with bulging disc. Pt has personal h/o LBP- prescribed Mobic with relief. Was advised XRay but pt declined. Denies h/o injury, tingling/numbness, bladder or bowel incontinence, dysuria, frequency, urgency. LOW BACK PAIN   The history is provided by the patient. This is a new problem. The current episode started more than 1 week ago. The problem occurs every several days. The problem has not changed since onset. Pertinent negatives include no chest pain, no abdominal pain, no headaches and no shortness of breath. Exacerbated by: prolonges sitting. The symptoms are relieved by NSAIDs and position. She has tried acetaminophen for the symptoms. The treatment provided mild relief. Review of Systems   Constitutional: Negative for chills and fever. Respiratory: Negative for shortness of breath. Cardiovascular: Negative for chest pain. Gastrointestinal: Negative for abdominal pain, constipation and diarrhea. Genitourinary: Negative for dysuria, flank pain, frequency, hematuria and urgency. Musculoskeletal: Positive for back pain. Negative for falls and joint pain. Neurological: Negative for tingling and headaches. Physical Exam   Constitutional: She is oriented to person, place, and time. She appears well-developed and well-nourished. No distress. Cardiovascular: Normal rate, regular rhythm and normal heart sounds.     Pulmonary/Chest: Effort normal and breath sounds normal.   Musculoskeletal:        Lumbar back: She exhibits decreased range of motion and pain. She exhibits no tenderness, no bony tenderness, no swelling, no edema, no deformity and no spasm. Negative SLR   Neurological: She is alert and oriented to person, place, and time. Skin: Skin is warm and dry. Visit Vitals    /81 (BP 1 Location: Right arm, BP Patient Position: Sitting)    Pulse (!) 59    Temp 95.9 °F (35.5 °C) (Oral)    Resp 14    Ht 5' 4\" (1.626 m)    Wt 143 lb 9.6 oz (65.1 kg)    SpO2 100%    BMI 24.65 kg/m2     ASSESSMENT and PLAN    ICD-10-CM ICD-9-CM    1. Acute bilateral low back pain without sciatica M54.5 724.2 meloxicam (MOBIC) 7.5 mg tablet     338.19    2. Joint stiffness of spine M25.60 719.58 Back stretches, exercise, avoid prolonged sitting, use cushion when sitting     Reviewed plan with patient including diagnoses, treatment and follow up. Provided AVS with education on above diagnoses. No further questions/concerns at this time. Pt to follow up as scheduled or sooner if symptoms worsen/fail to improve.

## 2018-07-06 ENCOUNTER — OFFICE VISIT (OUTPATIENT)
Dept: INTERNAL MEDICINE CLINIC | Age: 68
End: 2018-07-06

## 2018-07-06 VITALS
HEART RATE: 66 BPM | HEIGHT: 64 IN | BODY MASS INDEX: 24.59 KG/M2 | WEIGHT: 144 LBS | OXYGEN SATURATION: 98 % | TEMPERATURE: 97.5 F | RESPIRATION RATE: 16 BRPM | DIASTOLIC BLOOD PRESSURE: 65 MMHG | SYSTOLIC BLOOD PRESSURE: 109 MMHG

## 2018-07-06 DIAGNOSIS — Z85.3 HISTORY OF BREAST CANCER: ICD-10-CM

## 2018-07-06 DIAGNOSIS — Z00.00 MEDICARE ANNUAL WELLNESS VISIT, SUBSEQUENT: Primary | ICD-10-CM

## 2018-07-06 NOTE — PROGRESS NOTES
Identified pt with two pt identifiers(name and ). Reviewed record in preparation for visit and have obtained necessary documentation. Yelitza Singleton presents today for   Chief Complaint   Patient presents with    Annual Wellness Visit       Yelitza Singelton preferred language for health care discussion is english/other. Is someone accompanying this pt? No    Is the patient using any DME equipment during OV? No    Depression Screening:  PHQ over the last two weeks 2018   Little interest or pleasure in doing things Not at all Not at all Not at all Not at all Not at all Not at all Several days   Feeling down, depressed or hopeless Not at all Not at all Not at all Not at all Not at all Not at all Several days   Total Score PHQ 2 0 0 0 0 0 0 2       Learning Assessment:  Learning Assessment 2018 2015 12/3/2013   PRIMARY LEARNER Patient Patient Patient   HIGHEST LEVEL OF EDUCATION - PRIMARY LEARNER  4 YEARS OF COLLEGE [address]  -   CO-LEARNER CAREGIVER No No -   [facility]    NEED No - -   LEARNER PREFERENCE PRIMARY DEMONSTRATION DEMONSTRATION READING   LEARNING SPECIAL TOPICS no - -   ANSWERED BY patient Patient Patient   RELATIONSHIP SELF SELF SELF   ASSESSMENT COMMENT none - -       Abuse Screening:  Abuse Screening Questionnaire 2018   Do you ever feel afraid of your partner? N N   Are you in a relationship with someone who physically or mentally threatens you? N N   Is it safe for you to go home? Y Y       Fall Risk  Fall Risk Assessment, last 12 mths 2018   Able to walk? Yes Yes Yes Yes Yes Yes   Fall in past 12 months? No No No No No No       Health Maintenance reviewed and discussed per provider. Yes    .[email or web address]  Please order/place referral if appropriate. Advance Directive:  1. Do you have an advance directive in place? Patient Reply: No    2. If not, would you like material regarding how to put one in place? Patient Reply: No    Coordination of Care:  1. Have you been to the ER, urgent care clinic since your last visit? Hospitalized since your last visit? No    2. Have you seen or consulted any other health care providers outside of the [facility] since your last visit? Include any pap smears or colon screening.  NO

## 2018-07-06 NOTE — PROGRESS NOTES
A user error has taken place: encounter opened in error, closed for administrative reasons. This is the Subsequent Medicare Annual Wellness Exam, performed 12 months or more after the Initial AWV or the last Subsequent AWV    I have reviewed the patient's medical history in detail and updated the computerized patient record. History     Past Medical History:   Diagnosis Date    Breast cancer ([facility] [address] 2004    left, Dr. Cassia Moody [facility])     left breast cancer    Fibroid uterus     Other and unspecified hyperlipidemia 9/2014    Vitamin D deficiency 9/2014      Past Surgical History:   Procedure Laterality Date    BREAST SURGERY PROCEDURE UNLISTED  2004    left breast mastectomy, no LN    COLONOSCOPY N/A 7/6/2017    COLONOSCOPY performed by Sandee Hayes MD at [address] [place] [address] OTHER SURGICAL  5/2015    repair on retina hole by Dr. Bobbi Dorsye     Current Outpatient Prescriptions   Medication Sig Dispense Refill    meloxicam (MOBIC) 7.5 mg tablet Take 1 Tab by mouth daily. 30 Tab 0    loratadine (CLARITIN) 10 mg tablet Take 1 Tab by mouth daily as needed for Allergies.  bisacodyl (DULCOLAX, BISACODYL,) 5 mg EC tablet Take 5 mg by mouth daily as needed for Constipation (2 pills daily).  Calcium-Cholecalciferol, D3, 600 mg(1,500mg) -400 unit cap Take  by mouth. Take one po bid   Indications: new suppliment with calcium, vit d, and zinc       No Known Allergies  Family History   Problem Relation Age of Onset    Hypertension Mother     Diabetes Father     Diabetes Maternal Grandmother      Social History   Substance Use Topics    Smoking status: Never Smoker    Smokeless tobacco: Never Used      Comment: Pt counseled to continue to stop smoking.      Alcohol use No     Patient Active Problem List   Diagnosis Code    Cancer of breast ([facility] [address] C50.919    Breast cancer ([facility] [address] C50.919    Fibroid uterus D25.9    [facility] C[facility]    Vitamin D deficiency E55.9  Other and unspecified hyperlipidemia E78.5    Lower abdominal pain R10.30    Medicare annual wellness visit, subsequent Z00.00    History of breast cancer Z85.3       Depression Risk Factor Screening:     PHQ over the last two weeks 7/6/2018   Little interest or pleasure in doing things Not at all   Feeling down, depressed or hopeless Not at all   Total Score PHQ 2 0     Alcohol Risk Factor Screening: You do not drink alcohol or very rarely. Functional Ability and Level of Safety:   Hearing Loss  Hearing is good. Activities of Daily Living  The home contains: no safety equipment. Patient does total self care    Fall Risk  Fall Risk Assessment, last 12 mths 7/6/2018   Able to walk? Yes   Fall in past 12 months? No       Abuse Screen  Patient is not abused    Cognitive Screening   Evaluation of Cognitive Function:  Has your family/caregiver stated any concerns about your memory: no  Normal    Patient Care Team   Patient Care Team:  Darryl Roberto MD as PCP - General (Internal Medicine)  Mario Alberto Ramirez MD (Gastroenterology)  Arpit Billings MD (Ophthalmology)    Assessment/Plan   Education and counseling provided:  Are appropriate based on today's review and evaluation    Diagnoses and all orders for this visit:    1. Medicare annual wellness visit, subsequent    2. History of breast cancer  -     JAY MAMMO BI SCREENING INCL CAD;  Future        Health Maintenance Due   Topic Date Due    DTaP/Tdap/Td series (1 - Tdap) 02/10/1971

## 2018-07-06 NOTE — MR AVS SNAPSHOT
[address] [place] [address] 
 
 
 [address] [facility] [address] 
[phone] Patient: Elida Camacho MRN: [id number] LZO:[birth year] Visit Information Date & Time Provider Department Dept. Phone Encounter #  
 7/6/2018  9:00 AM Gary Scott NP [address] [phone] [id number] Follow-up Instructions Return if symptoms worsen or fail to improve, for follow up. Follow-up and Disposition History Upcoming Health Maintenance Date Due DTaP/Tdap/Td series (1 - Tdap) 2/10/1971 Influenza Age 5 to Adult 8/1/2018 GLAUCOMA SCREENING Q2Y 12/27/2018 BREAST CANCER SCRN MAMMOGRAM 5/17/2019 COLONOSCOPY 7/6/2022 Allergies as of 7/6/2018  Review Complete On: 7/6/2018 By: Kourtney Gleason LPN No Known Allergies Current Immunizations  Reviewed on 5/17/2017 Name Date Pneumococcal Conjugate (PCV-13) 5/17/2017 Pneumococcal Polysaccharide (PPSV-23) 3/9/2016 Not reviewed this visit You Were Diagnosed With   
  
 Codes Comments Medicare annual wellness visit, subsequent    -  Primary ICD-10-CM: Z00.00 ICD-9-CM: V70.0 History of breast cancer     ICD-10-CM: Z85.3 ICD-9-CM: V10.3 Vitals BP Pulse Temp Resp Height(growth percentile) Weight(growth percentile) 109/65 (BP 1 Location: Right arm, BP Patient Position: Sitting) 66 97.5 °F (36.4 °C) (Oral) 16 5' 4\" (1.626 m) 144 lb (65.3 kg) SpO2 BMI OB Status Smoking Status 98% 24.72 kg/m2 Postmenopausal Never Smoker Vitals History BMI and BSA Data Body Mass Index Body Surface Area 24.72 kg/m 2 1.72 m 2 Preferred Pharmacy Pharmacy Name Phone [facility] [address] [phone] Your Updated Medication List  
  
   
This list is accurate as of 7/6/18  9:37 AM.  Always use your most recent med list.  
  
  
  
  
 Calcium-Cholecalciferol (D3) 600 mg(1,500mg) -400 unit Cap Take  by mouth. Take one po bid   Indications: new suppliment with calcium, vit d, and zinc  
  
 CLARITIN 10 mg tablet Generic drug:  loratadine Take 1 Tab by mouth daily as needed for Allergies. DULCOLAX (BISACODYL) 5 mg EC tablet Generic drug:  bisacodyl Take 5 mg by mouth daily as needed for Constipation (2 pills daily). meloxicam 7.5 mg tablet Commonly known as:  MOBIC Take 1 Tab by mouth daily. Follow-up Instructions Return if symptoms worsen or fail to improve, for follow up. To-Do List   
 07/06/2018 Imaging:  JAY MAMMO BI SCREENING INCL CAD Patient Instructions Medicare Wellness Visit, Female The best way to live healthy is to have a lifestyle where you eat a well-balanced diet, exercise regularly, limit alcohol use, and quit all forms of tobacco/nicotine, if applicable. Regular preventive services are another way to keep healthy. Preventive services (vaccines, screening tests, monitoring & exams) can help personalize your care plan, which helps you manage your own care. Screening tests can find health problems at the earliest stages, when they are easiest to treat. [address] follows the current, evidence-based guidelines published by the [facility] Jitendra Elaine (USPSTF) when recommending preventive services for our patients. Because we follow these guidelines, sometimes recommendations change over time as research supports it. (For example, mammograms used to be recommended annually. Even though Medicare will still pay for an annual mammogram, the newer guidelines recommend a mammogram every two years for women of average risk.) Of course, you and your provider may decide to screen more often for some diseases, based on your risk and co-morbidities (chronic disease you are already diagnosed with). Preventive services for you include: - Medicare offers their members a free annual wellness visit, which is time for you and your primary care provider to discuss and plan for your preventive service needs. Take advantage of this benefit every year! 
 
-All people over age 72 should receive the recommended pneumonia vaccines. Current USPSTF guidelines recommend a series of two vaccines for the best pneumonia protection.  
 
-All adults should have a yearly flu vaccine and a tetanus vaccine every 10 years. All adults age 61 years should receive a shingles vaccine once in their lifetime.   
 
-A bone mass density test is recommended when a woman turns 65 to screen for osteoporosis. This test is only recommended once as a screening. Some providers will use this same test as a disease monitoring tool if you already have osteoporosis. -All adults age 38-68 years who are overweight should have a diabetes screening test once every three years.  
 
-Other screening tests & preventive services for persons with diabetes include: an eye exam to screen for diabetic retinopathy, a kidney function test, a foot exam, and stricter control over your cholesterol.  
 
-Cardiovascular screening for adults with routine risk involves an electrocardiogram (ECG) at intervals determined by the provider.  
 
-Colorectal cancer screenings should be done for adults age 54-65 years with normal risk. There are a number of acceptable methods of screening for this type of cancer. Each test has its own benefits and drawbacks. Discuss with your provider what is most appropriate for you during your annual wellness visit. The different tests include: colonoscopy (considered the best screening method), a fecal occult blood test, a fecal DNA test, and sigmoidoscopy.  
 
-Breast cancer screenings are recommended every other year for women of normal risk age 54-69 years.  
 
-Cervical cancer screenings for women over age 72 are only recommended with certain risk factors.  
 
-All adults born between [facility] should be screened once for Hepatitis C. Here is a list of your current Health Maintenance items (your personalized list of preventive services) with a due date: 
Health Maintenance Due Topic Date Due  
 DTaP/Tdap/Td  (1 - Tdap) 02/10/1971 Introducing [facility] & HEALTH SERVICES! [employer] introduces fruux patient portal. Now you can access parts of your medical record, email your doctor's office, and request medication refills online. 1. In your internet browser, go to [email or web address] 2. Click on the First Time User? Click Here link in the Sign In box. You will see the New Member Sign Up page. 3. Enter your fruux Access Code exactly as it appears below. You will not need to use this code after youve completed the sign-up process. If you do not sign up before the expiration date, you must request a new code. · fruux Access Code: [email or web address] Expires: 8/23/2018  7:56 AM 
 
4. Enter the last four digits of your Social Security Number (xxxx) and Date of Birth (mm/dd/yyyy) as indicated and click Submit. You will be taken to the next sign-up page. 5. Create a fruux ID. This will be your fruux login ID and cannot be changed, so think of one that is secure and easy to remember. 6. Create a fruux password. You can change your password at any time. 7. Enter your Password Reset Question and Answer. This can be used at a later time if you forget your password. 8. Enter your e-mail address. You will receive e-mail notification when new information is available in [address] 9. Click Sign Up. You can now view and download portions of your medical record. 10. Click the Download Summary menu link to download a portable copy of your medical information. If you have questions, please visit the Frequently Asked Questions section of the fruux website.  Remember, fruux is NOT to be used for urgent needs. For medical emergencies, dial 911. Now available from your iPhone and Android! Please provide this summary of care documentation to your next provider. Your primary care clinician is listed as Mariangel Sigala. If you have any questions after today's visit, please call [phone].

## 2018-07-06 NOTE — PATIENT INSTRUCTIONS
Medicare Wellness Visit, Female    The best way to live healthy is to have a lifestyle where you eat a well-balanced diet, exercise regularly, limit alcohol use, and quit all forms of tobacco/nicotine, if applicable. Regular preventive services are another way to keep healthy. Preventive services (vaccines, screening tests, monitoring & exams) can help personalize your care plan, which helps you manage your own care. Screening tests can find health problems at the earliest stages, when they are easiest to treat. [address] follows the current, evidence-based guidelines published by the [facility] Jitendra Elaine ([facility]) when recommending preventive services for our patients. Because we follow these guidelines, sometimes recommendations change over time as research supports it. (For example, mammograms used to be recommended annually. Even though Medicare will still pay for an annual mammogram, the newer guidelines recommend a mammogram every two years for women of average risk.)    Of course, you and your provider may decide to screen more often for some diseases, based on your risk and co-morbidities (chronic disease you are already diagnosed with). Preventive services for you include:    - Medicare offers their members a free annual wellness visit, which is time for you and your primary care provider to discuss and plan for your preventive service needs. Take advantage of this benefit every year!    -All people over age 72 should receive the recommended pneumonia vaccines. Current USPSTF guidelines recommend a series of two vaccines for the best pneumonia protection.     -All adults should have a yearly flu vaccine and a tetanus vaccine every 10 years. All adults age 61 years should receive a shingles vaccine once in their lifetime.      -A bone mass density test is recommended when a woman turns 65 to screen for osteoporosis.  This test is only recommended once as a screening. Some providers will use this same test as a disease monitoring tool if you already have osteoporosis. -All adults age 38-68 years who are overweight should have a diabetes screening test once every three years.     -Other screening tests & preventive services for persons with diabetes include: an eye exam to screen for diabetic retinopathy, a kidney function test, a foot exam, and stricter control over your cholesterol.     -Cardiovascular screening for adults with routine risk involves an electrocardiogram (ECG) at intervals determined by the provider.     -Colorectal cancer screenings should be done for adults age 54-65 years with normal risk. There are a number of acceptable methods of screening for this type of cancer. Each test has its own benefits and drawbacks. Discuss with your provider what is most appropriate for you during your annual wellness visit. The different tests include: colonoscopy (considered the best screening method), a fecal occult blood test, a fecal DNA test, and sigmoidoscopy. -Breast cancer screenings are recommended every other year for women of normal risk age 54-69 years.     -Cervical cancer screenings for women over age 72 are only recommended with certain risk factors.     -All adults born between [facility] should be screened once for Hepatitis C.      Here is a list of your current Health Maintenance items (your personalized list of preventive services) with a due date:  Health Maintenance Due   Topic Date Due    DTaP/Tdap/Td  (1 - Tdap) 02/10/1971

## 2018-08-09 DIAGNOSIS — Z12.31 ENCOUNTER FOR SCREENING MAMMOGRAM FOR BREAST CANCER: Primary | ICD-10-CM

## 2018-08-21 DIAGNOSIS — Z85.3 HISTORY OF BREAST CANCER: ICD-10-CM

## 2018-09-07 ENCOUNTER — OFFICE VISIT (OUTPATIENT)
Dept: INTERNAL MEDICINE CLINIC | Age: 68
End: 2018-09-07

## 2018-09-07 ENCOUNTER — HOSPITAL ENCOUNTER (OUTPATIENT)
Dept: GENERAL RADIOLOGY | Age: 68
Discharge: HOME | End: 2018-09-07
Payer: MEDICARE

## 2018-09-07 VITALS
BODY MASS INDEX: 24.07 KG/M2 | RESPIRATION RATE: 19 BRPM | TEMPERATURE: 97.7 F | HEIGHT: 64 IN | SYSTOLIC BLOOD PRESSURE: 118 MMHG | DIASTOLIC BLOOD PRESSURE: 87 MMHG | WEIGHT: 141 LBS | HEART RATE: 63 BPM | OXYGEN SATURATION: 100 %

## 2018-09-07 DIAGNOSIS — S51.812A: ICD-10-CM

## 2018-09-07 DIAGNOSIS — M79.602 PAIN IN INFERIOR LEFT UPPER EXTREMITY: ICD-10-CM

## 2018-09-07 DIAGNOSIS — M79.602 PAIN IN INFERIOR LEFT UPPER EXTREMITY: Primary | ICD-10-CM

## 2018-09-07 PROCEDURE — 73060 X-RAY EXAM OF HUMERUS: CPT

## 2018-09-07 PROCEDURE — 73090 X-RAY EXAM OF FOREARM: CPT

## 2018-09-07 RX ORDER — BACITRACIN ZINC 500 UNIT/G
OINTMENT (GRAM) TOPICAL 2 TIMES DAILY
Qty: 15 G | Refills: 0 | Status: SHIPPED | OUTPATIENT
Start: 2018-09-07 | End: 2018-12-18

## 2018-09-07 NOTE — MR AVS SNAPSHOT
[facility] 
 
 
 [address] [facility] [address] 
[phone] Patient: Sahara Fishman MRN: [id number] WST:[birth year] Visit Information Date & Time Provider Department Dept. Phone Encounter #  
 9/7/2018  2:15 PM Terra Prasad NP [facility] [phone] [id number] Upcoming Health Maintenance Date Due DTaP/Tdap/Td series (1 - Tdap) 2/10/1971 Influenza Age 5 to Adult 8/1/2018 GLAUCOMA SCREENING Q2Y 12/27/2018 BREAST CANCER SCRN MAMMOGRAM 8/3/2020 COLONOSCOPY 7/6/2022 Allergies as of 9/7/2018  Review Complete On: 9/7/2018 By: Damaris Rosales No Known Allergies Current Immunizations  Reviewed on 5/17/2017 Name Date Pneumococcal Conjugate (PCV-13) 5/17/2017 Pneumococcal Polysaccharide (PPSV-23) 3/9/2016 Not reviewed this visit You Were Diagnosed With   
  
 Codes Comments Pain in inferior left upper extremity    -  Primary ICD-10-CM: N40.287 ICD-9-CM: 729.5 Laceration of skin of forearm, left, initial encounter     ICD-10-CM: S56.989R ICD-9-CM: 881.00 Vitals BP Pulse Temp Resp Height(growth percentile) Weight(growth percentile) 118/87 (BP 1 Location: Right arm, BP Patient Position: Sitting) 63 97.7 °F (36.5 °C) (Oral) 19 5' 4\" (1.626 m) 141 lb (64 kg) SpO2 BMI OB Status Smoking Status 100% 24.2 kg/m2 Postmenopausal Never Smoker Vitals History BMI and BSA Data Body Mass Index Body Surface Area  
 24.2 kg/m 2 1.7 m 2 Preferred Pharmacy Pharmacy Name Phone [facility] [address] [phone] Your Updated Medication List  
  
   
This list is accurate as of 9/7/18  2:38 PM.  Always use your most recent med list.  
  
  
  
  
 bacitracin zinc ointment Commonly known as:  BACITRACIN Apply  to affected area two (2) times a day. Calcium-Cholecalciferol (D3) 600 mg(1,500mg) -400 unit Cap Take  by mouth. Take one po bid   Indications: new suppliment with calcium, vit d, and zinc  
  
 CLARITIN 10 mg tablet Generic drug:  loratadine Take 1 Tab by mouth daily as needed for Allergies. DULCOLAX (BISACODYL) 5 mg EC tablet Generic drug:  bisacodyl Take 5 mg by mouth daily as needed for Constipation (2 pills daily). meloxicam 7.5 mg tablet Commonly known as:  MOBIC Take 1 Tab by mouth daily. Prescriptions Sent to Pharmacy Refills  
 bacitracin zinc (BACITRACIN) ointment 0 Sig: Apply  to affected area two (2) times a day. Class: Normal  
 Pharmacy: [id number] [address] [place] [address] Ph #: [phone] Route: Topical  
  
To-Do List   
 09/07/2018 Imaging:  XR FOREARM LT AP/LAT   
  
 09/07/2018 Imaging:  XR HUMERUS LT Patient Instructions Cuts: Care Instructions Your Care Instructions A cut can happen anywhere on your body. Stitches, staples, skin adhesives, or pieces of tape called Steri-Strips are sometimes used to keep the edges of a cut together and help it heal. Steri-Strips can be used by themselves or with stitches or staples. Sometimes cuts are left open. If the cut went deep and through the skin, the doctor may have closed the cut in two layers. A deeper layer of stitches brings the deep part of the cut together. These stitches will dissolve and don't need to be removed. The upper layer closure, which could be stitches, staples, Steri-Strips, or adhesive, is what you see on the cut. A cut is often covered by a bandage. The doctor has checked you carefully, but problems can develop later. If you notice any problems or new symptoms, get medical treatment right away. Follow-up care is a key part of your treatment and safety.  Be sure to make and go to all appointments, and call your doctor if you are having problems. It's also a good idea to know your test results and keep a list of the medicines you take. How can you care for yourself at home? If a cut is open or closed · Prop up the sore area on a pillow anytime you sit or lie down during the next 3 days. Try to keep it above the level of your heart. This will help reduce swelling. · Keep the cut dry for the first 24 to 48 hours. After this, you can shower if your doctor okays it. Pat the cut dry. · Don't soak the cut, such as in a bathtub. Your doctor will tell you when it's safe to get the cut wet. · After the first 24 to 48 hours, clean the cut with soap and water 2 times a day unless your doctor gives you different instructions. ¨ Don't use hydrogen peroxide or alcohol, which can slow healing. ¨ You may cover the cut with a thin layer of petroleum jelly and a nonstick bandage. ¨ If the doctor put a bandage over the cut, put on a new bandage after cleaning the cut or if the bandage gets wet or dirty. · Avoid any activity that could cause your cut to reopen. · Be safe with medicines. Read and follow all instructions on the label. ¨ If the doctor gave you a prescription medicine for pain, take it as prescribed. ¨ If you are not taking a prescription pain medicine, ask your doctor if you can take an over-the-counter medicine. If the cut is closed with stitches, staples, or Steri-Strips · Follow the above instructions for open or closed cuts. · Do not remove the stitches or staples on your own. Your doctor will tell you when to come back to have the stitches or staples removed. · Leave Steri-Strips on until they fall off. If the cut is closed with a skin adhesive · Follow the above instructions for open or closed cuts. · Leave the skin adhesive on your skin until it falls off on its own. This may take 5 to 10 days. · Do not scratch, rub, or pick at the adhesive. · Do not put the sticky part of a bandage directly on the adhesive. · Do not put any kind of ointment, cream, or lotion over the area. This can make the adhesive fall off too soon. Do not use hydrogen peroxide or alcohol, which can slow healing. When should you call for help? Call your doctor now or seek immediate medical care if: 
  · You have new pain, or your pain gets worse.  
  · The skin near the cut is cold or pale or changes color.  
  · You have tingling, weakness, or numbness near the cut.  
  · The cut starts to bleed, and blood soaks through the bandage. Oozing small amounts of blood is normal.  
  · You have trouble moving the area near the cut.  
  · You have symptoms of infection, such as: 
¨ Increased pain, swelling, warmth, or redness around the cut. ¨ Red streaks leading from the cut. ¨ Pus draining from the cut. ¨ A fever.  
 Watch closely for changes in your health, and be sure to contact your doctor if: 
  · The cut reopens.  
  · You do not get better as expected. Where can you learn more? Go to http://servando-grisel.info/. Enter M735 in the search box to learn more about \"Cuts: Care Instructions. \" Current as of: November 20, 2017 Content Version: 11.7 © 0973-1860 [employer]. Care instructions adapted under license by [employer] (which disclaims liability or warranty for this information). If you have questions about a medical condition or this instruction, always ask your healthcare professional. [place] [address] any warranty or liability for your use of this information. Introducing [facility] & HEALTH SERVICES! [facility] introduces [employer] patient portal. Now you can access parts of your medical record, email your doctor's office, and request medication refills online. 1. In your internet browser, go to [email or web address]. [employer]MeetingSprout 2. Click on the First Time User? Click Here link in the Sign In box. You will see the New Member Sign Up page. 3. Enter your Quincus Access Code exactly as it appears below. You will not need to use this code after youve completed the sign-up process. If you do not sign up before the expiration date, you must request a new code. · Quincus Access Code: [email or web address] Expires: 12/6/2018  2:38 PM 
 
4. Enter the last four digits of your Social Security Number (xxxx) and Date of Birth (mm/dd/yyyy) as indicated and click Submit. You will be taken to the next sign-up page. 5. Create a Quincus ID. This will be your Quincus login ID and cannot be changed, so think of one that is secure and easy to remember. 6. Create a Quincus password. You can change your password at any time. 7. Enter your Password Reset Question and Answer. This can be used at a later time if you forget your password. 8. Enter your e-mail address. You will receive e-mail notification when new information is available in [address] 9. Click Sign Up. You can now view and download portions of your medical record. 10. Click the Download Summary menu link to download a portable copy of your medical information. If you have questions, please visit the Frequently Asked Questions section of the Quincus website. Remember, Quincus is NOT to be used for urgent needs. For medical emergencies, dial 911. Now available from your iPhone and Android! Please provide this summary of care documentation to your next provider. Your primary care clinician is listed as Mariangel Sigala. If you have any questions after today's visit, please call [phone].

## 2018-09-07 NOTE — PROGRESS NOTES
ROOM # 1  Identified pt with two pt identifiers(name and ). Reviewed record in preparation for visit and have obtained necessary documentation. Chief Complaint   Patient presents with    Abrasion     left arm       Adair Radha Villanuevarafal preferred language for health care discussion is english/other. Is the patient using any DME equipment during OV? Kersey Kras is due for:  Health Maintenance Due   Topic    DTaP/Tdap/Td series (1 - Tdap)    Influenza Age 5 to Adult      Health Maintenance reviewed and discussed per provider  Please order/place referral if appropriate. Advance Directive:  1. Do you have an advance directive in place? Patient Reply: NO    2. If not, would you like material regarding how to put one in place? NO    Coordination of Care:  1. Have you been to the ER, urgent care clinic since your last visit? Hospitalized since your last visit? NO    2. Have you seen or consulted any other health care providers outside of the [address] [place] [address] since your last visit? Include any pap smears or colon screening. NO    Patient is accompanied by self I have received verbal consent from Jasson Turcios to discuss any/all medical information while they are present in the room.     Learning Assessment:  Learning Assessment 2018 2015 12/3/2013   PRIMARY LEARNER Patient Patient Patient   HIGHEST LEVEL OF EDUCATION - PRIMARY LEARNER  4 YEARS OF COLLEGE 2 YEARS OF COLLEGE -   BARRIERS PRIMARY LEARNER NONE NONE -   CO-LEARNER CAREGIVER No No -   PRIMARY LANGUAGE ENGLISH ENGLISH ENGLISH    NEED No - -   LEARNER PREFERENCE PRIMARY DEMONSTRATION DEMONSTRATION READING   LEARNING SPECIAL TOPICS no - -   ANSWERED BY patient Patient Patient   RELATIONSHIP SELF SELF SELF   ASSESSMENT COMMENT none - -     Depression Screening:  PHQ over the last two weeks 2018   Little interest or pleasure in doing things Not at all Not at all Not at all Not at all Not at all Not at all Several days   Feeling down, depressed, irritable, or hopeless Not at all Not at all Not at all Not at all Not at all Not at all Several days   Total Score PHQ 2 0 0 0 0 0 0 2     Abuse Screening:  Abuse Screening Questionnaire 7/6/2018 9/17/2015   Do you ever feel afraid of your partner? N N   Are you in a relationship with someone who physically or mentally threatens you? N N   Is it safe for you to go home? Y Y     Fall Risk  Fall Risk Assessment, last 12 mths 7/6/2018 8/9/2017 5/17/2017 4/20/2017 12/1/2016 7/7/2015   Able to walk? Yes Yes Yes Yes Yes Yes   Fall in past 12 months?  No No No No No No
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
## 2018-09-07 NOTE — PATIENT INSTRUCTIONS
Cuts: Care Instructions  Your Care Instructions  A cut can happen anywhere on your body. Stitches, staples, skin adhesives, or pieces of tape called Steri-Strips are sometimes used to keep the edges of a cut together and help it heal. Steri-Strips can be used by themselves or with stitches or staples. Sometimes cuts are left open. If the cut went deep and through the skin, the doctor may have closed the cut in two layers. A deeper layer of stitches brings the deep part of the cut together. These stitches will dissolve and don't need to be removed. The upper layer closure, which could be stitches, staples, Steri-Strips, or adhesive, is what you see on the cut. A cut is often covered by a bandage. The doctor has checked you carefully, but problems can develop later. If you notice any problems or new symptoms, get medical treatment right away. Follow-up care is a key part of your treatment and safety. Be sure to make and go to all appointments, and call your doctor if you are having problems. It's also a good idea to know your test results and keep a list of the medicines you take. How can you care for yourself at home? If a cut is open or closed  · Prop up the sore area on a pillow anytime you sit or lie down during the next 3 days. Try to keep it above the level of your heart. This will help reduce swelling. · Keep the cut dry for the first 24 to 48 hours. After this, you can shower if your doctor okays it. Pat the cut dry. · Don't soak the cut, such as in a bathtub. Your doctor will tell you when it's safe to get the cut wet. · After the first 24 to 48 hours, clean the cut with soap and water 2 times a day unless your doctor gives you different instructions. ¨ Don't use hydrogen peroxide or alcohol, which can slow healing. ¨ You may cover the cut with a thin layer of petroleum jelly and a nonstick bandage.   ¨ If the doctor put a bandage over the cut, put on a new bandage after cleaning the cut or if the bandage gets wet or dirty. · Avoid any activity that could cause your cut to reopen. · Be safe with medicines. Read and follow all instructions on the label. ¨ If the doctor gave you a prescription medicine for pain, take it as prescribed. ¨ If you are not taking a prescription pain medicine, ask your doctor if you can take an over-the-counter medicine. If the cut is closed with stitches, staples, or Steri-Strips  · Follow the above instructions for open or closed cuts. · Do not remove the stitches or staples on your own. Your doctor will tell you when to come back to have the stitches or staples removed. · Leave Steri-Strips on until they fall off. If the cut is closed with a skin adhesive  · Follow the above instructions for open or closed cuts. · Leave the skin adhesive on your skin until it falls off on its own. This may take 5 to 10 days. · Do not scratch, rub, or pick at the adhesive. · Do not put the sticky part of a bandage directly on the adhesive. · Do not put any kind of ointment, cream, or lotion over the area. This can make the adhesive fall off too soon. Do not use hydrogen peroxide or alcohol, which can slow healing. When should you call for help? Call your doctor now or seek immediate medical care if:    · You have new pain, or your pain gets worse.     · The skin near the cut is cold or pale or changes color.     · You have tingling, weakness, or numbness near the cut.     · The cut starts to bleed, and blood soaks through the bandage. Oozing small amounts of blood is normal.     · You have trouble moving the area near the cut.     · You have symptoms of infection, such as:  ¨ Increased pain, swelling, warmth, or redness around the cut. ¨ Red streaks leading from the cut. ¨ Pus draining from the cut. ¨ A fever.    Watch closely for changes in your health, and be sure to contact your doctor if:    · The cut reopens.     · You do not get better as expected.    Where can you learn more? Go to http://servando-grisel.info/. Enter M735 in the search box to learn more about \"Cuts: Care Instructions. \"  Current as of: November 20, 2017  Content Version: 11.7  © 2597-9180 [employer]. Care instructions adapted under license by [employer] (which disclaims liability or warranty for this information). If you have questions about a medical condition or this instruction, always ask your healthcare professional. [address] any warranty or liability for your use of this information.

## 2018-09-19 ENCOUNTER — TELEPHONE (OUTPATIENT)
Dept: INTERNAL MEDICINE CLINIC | Age: 68
End: 2018-09-19

## 2018-09-20 NOTE — TELEPHONE ENCOUNTER
I did not order these x-rays. Alka Mauro NP ordered when she saw her on 9/7/18. However, the left forearm x-ray showed moderate tissue swelling from part of forearm to the elbow. Left upper arm x-ray was negative. Is her arm pain getting better?

## 2018-09-21 ENCOUNTER — DOCUMENTATION ONLY (OUTPATIENT)
Dept: INTERNAL MEDICINE CLINIC | Age: 68
End: 2018-09-21

## 2018-09-21 DIAGNOSIS — S51.812A: ICD-10-CM

## 2018-09-21 DIAGNOSIS — M79.602 PAIN IN INFERIOR LEFT UPPER EXTREMITY: Primary | ICD-10-CM

## 2018-09-21 DIAGNOSIS — M25.422 SWELLING OF ELBOW JOINT, LEFT: ICD-10-CM

## 2018-09-21 RX ORDER — SULFAMETHOXAZOLE AND TRIMETHOPRIM 800; 160 MG/1; MG/1
1 TABLET ORAL 2 TIMES DAILY
Qty: 20 TAB | Refills: 0 | Status: SHIPPED | OUTPATIENT
Start: 2018-09-21 | End: 2018-10-01

## 2018-09-21 NOTE — TELEPHONE ENCOUNTER
Attempted to contact pt at  number, no answer. Lvm for pt to return call to office at [phone]. Will continue to try to contact pt.

## 2018-09-24 ENCOUNTER — OFFICE VISIT (OUTPATIENT)
Dept: INTERNAL MEDICINE CLINIC | Age: 68
End: 2018-09-24

## 2018-09-24 ENCOUNTER — TELEPHONE (OUTPATIENT)
Dept: INTERNAL MEDICINE CLINIC | Age: 68
End: 2018-09-24

## 2018-09-24 VITALS
BODY MASS INDEX: 23.39 KG/M2 | SYSTOLIC BLOOD PRESSURE: 123 MMHG | HEART RATE: 76 BPM | DIASTOLIC BLOOD PRESSURE: 75 MMHG | RESPIRATION RATE: 20 BRPM | TEMPERATURE: 98.1 F | HEIGHT: 64 IN | OXYGEN SATURATION: 97 % | WEIGHT: 137 LBS

## 2018-09-24 DIAGNOSIS — T14.8XXA HEMATOMA: Primary | ICD-10-CM

## 2018-09-24 NOTE — TELEPHONE ENCOUNTER
Called to check patient regarding her arm swelling. She stated its about the same. She is advised to come for re evaluation.
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
## 2018-09-24 NOTE — PROGRESS NOTES
HISTORY OF PRESENT ILLNESS  Hanna Grewal is a 76 y.o. female. HPI Comments: Patient came for evaluation of swelling on the medial aspect of left elbow, very mild tenderness, stiffness present in the morning. The laceration on the arm has slight drainage. Abrasion          Review of Systems   Constitutional: Negative for chills, fever and malaise/fatigue. Respiratory: Negative for shortness of breath. Cardiovascular: Negative for chest pain and palpitations. Gastrointestinal: Negative for heartburn, nausea and vomiting. Musculoskeletal: Negative for myalgias. Skin: Negative for itching and rash. Neurological: Negative for dizziness and headaches. Psychiatric/Behavioral: The patient is not nervous/anxious. Physical Exam   Constitutional: She is oriented to person, place, and time. She appears well-developed and well-nourished. No distress. HENT:   Head: Normocephalic and atraumatic. Eyes: Pupils are equal, round, and reactive to light. Pulmonary/Chest: Breath sounds normal.   Neurological: She is alert and oriented to person, place, and time. Skin: Skin is warm and dry. She is not diaphoretic. There is erythema. Psychiatric: She has a normal mood and affect. Nursing note and vitals reviewed. ASSESSMENT and PLAN    ICD-10-CM ICD-9-CM    1. Hematoma T14. 8XXA 924.9    Patient teaching done regarding wound clean and care and look for signs of infection. Complete the antibiotics given. For hematoma apply heating pad 3-4 times a day and apply slight massage. Return if condition fail to respond or gets worse in ext 7 days. Patient was seen by dr Giovanna Moody and he also suggested symtomatic treatment of hematoma as of now. Further plan of care will be established accordingly.

## 2018-12-18 ENCOUNTER — OFFICE VISIT (OUTPATIENT)
Dept: INTERNAL MEDICINE CLINIC | Age: 68
End: 2018-12-18

## 2018-12-18 ENCOUNTER — DOCUMENTATION ONLY (OUTPATIENT)
Dept: INTERNAL MEDICINE CLINIC | Age: 68
End: 2018-12-18

## 2018-12-18 VITALS
WEIGHT: 137.4 LBS | TEMPERATURE: 95.7 F | HEART RATE: 61 BPM | SYSTOLIC BLOOD PRESSURE: 120 MMHG | RESPIRATION RATE: 16 BRPM | DIASTOLIC BLOOD PRESSURE: 76 MMHG | HEIGHT: 64 IN | BODY MASS INDEX: 23.46 KG/M2 | OXYGEN SATURATION: 99 %

## 2018-12-18 DIAGNOSIS — M25.522 LEFT ELBOW PAIN: ICD-10-CM

## 2018-12-18 DIAGNOSIS — M79.602 LEFT ARM PAIN: ICD-10-CM

## 2018-12-18 DIAGNOSIS — M79.671 PAIN OF RIGHT HEEL: Primary | ICD-10-CM

## 2018-12-18 NOTE — PATIENT INSTRUCTIONS
1) Use heating pad 1-2 times a day for 5-10 minutes for the mass. 2) Use padding in shoe. 3) Follow-up as needed or sooner if worsening symptoms.

## 2018-12-18 NOTE — PROGRESS NOTES
Pharmacy Note: Immunization Update    Patient: Og Oneill (2 y.o., 1950)     Patient's immunization history was updated to reflect information contained in the [place] and/or outside immunization/pharmacy records were reconciled within [facility]. Health Maintenance schedule updated when appropriate.     Current immunizations now reflect:       Immunization History   Administered Date(s) Administered    Hep B Vaccine 09/08/1997, 11/13/1997, 03/23/1998    IPV 06/09/2003    MMR 06/09/2003    Pneumococcal Conjugate (PCV-13) 05/17/2017    Pneumococcal Polysaccharide (PPSV-23) 03/09/2016    Td 11/19/1997    Zoster Vaccine, Live 08/02/2016       Farzana Tena, GarfieldD, BCACP

## 2018-12-18 NOTE — PROGRESS NOTES
ROOM # 1    Gene Koch presents today for   Chief Complaint   Patient presents with    Foot Pain     right heel pain while walking    Arm Pain     left post FA pain from old arm injury    Cholesterol Problem     f/u       Gene Koch preferred language for health care discussion is english/other. Is someone accompanying this pt? no    Is the patient using any DME equipment during OV? no    Depression Screening:  PHQ over the last two weeks 7/6/2018 8/9/2017 5/17/2017 4/20/2017 12/1/2016 7/7/2015 5/13/2014   Little interest or pleasure in doing things Not at all Not at all Not at all Not at all Not at all Not at all Several days   Feeling down, depressed, irritable, or hopeless Not at all Not at all Not at all Not at all Not at all Not at all Several days   Total Score PHQ 2 0 0 0 0 0 0 2       Learning Assessment:  Learning Assessment 7/6/2018 7/7/2015 12/3/2013   PRIMARY LEARNER Patient Patient Patient   HIGHEST LEVEL OF EDUCATION - PRIMARY LEARNER  4 YEARS OF COLLEGE 2 [address] -   CO-LEARNER CAREGIVER No No -   [facility]    NEED No - -   LEARNER PREFERENCE PRIMARY DEMONSTRATION DEMONSTRATION READING   LEARNING SPECIAL TOPICS no - -   ANSWERED BY patient Patient Patient   RELATIONSHIP SELF SELF SELF   ASSESSMENT COMMENT none - -       Abuse Screening:  Abuse Screening Questionnaire 7/6/2018 9/17/2015   Do you ever feel afraid of your partner? N N   Are you in a relationship with someone who physically or mentally threatens you? N N   Is it safe for you to go home? Y Y       Fall Risk  Fall Risk Assessment, last 12 mths 7/6/2018 8/9/2017 5/17/2017 4/20/2017 12/1/2016 7/7/2015   Able to walk? Yes Yes Yes Yes Yes Yes   Fall in past 12 months? No No No No No No       Health Maintenance reviewed and discussed per provider.  Yes    Gene Koch is due for   Health Maintenance Due   Topic Date Due    DTaP/Tdap/Td series (1 - Tdap) 02/10/1971    Shingrix Vaccine Age 50> (1 of 2) 02/10/2000    GLAUCOMA SCREENING Q2Y  12/27/2018   HM to be d/w provider      Please order/place referral if appropriate. Advance Directive:  1. Do you have an advance directive in place? Patient Reply: no    2. If not, would you like material regarding how to put one in place? Patient Reply: no    Coordination of Care:  1. Have you been to the ER, urgent care clinic since your last visit? Hospitalized since your last visit? no    2. Have you seen or consulted any other health care providers outside of the [address] [place] [address] since your last visit? Include any pap smears or colon screening.  no
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
## 2018-12-20 ENCOUNTER — HOSPITAL ENCOUNTER (OUTPATIENT)
Dept: GENERAL RADIOLOGY | Age: 68
Discharge: HOME | End: 2018-12-20
Payer: MEDICARE

## 2018-12-20 DIAGNOSIS — M79.671 PAIN OF RIGHT HEEL: ICD-10-CM

## 2018-12-20 PROCEDURE — 73630 X-RAY EXAM OF FOOT: CPT

## 2018-12-21 ENCOUNTER — TELEPHONE (OUTPATIENT)
Dept: INTERNAL MEDICINE CLINIC | Age: 68
End: 2018-12-21

## 2018-12-21 NOTE — TELEPHONE ENCOUNTER
----- Message from Torito Jane MD sent at 12/21/2018  8:17 AM EST -----  Please advise pt that no obvious cause for her heel pain was seen on her xray

## 2019-04-04 ENCOUNTER — OFFICE VISIT (OUTPATIENT)
Dept: INTERNAL MEDICINE CLINIC | Age: 69
End: 2019-04-04

## 2019-04-04 VITALS
BODY MASS INDEX: 22.53 KG/M2 | HEIGHT: 64 IN | SYSTOLIC BLOOD PRESSURE: 132 MMHG | OXYGEN SATURATION: 97 % | RESPIRATION RATE: 16 BRPM | HEART RATE: 63 BPM | DIASTOLIC BLOOD PRESSURE: 87 MMHG | WEIGHT: 132 LBS | TEMPERATURE: 97.9 F

## 2019-04-04 DIAGNOSIS — R53.83 FATIGUE, UNSPECIFIED TYPE: Primary | ICD-10-CM

## 2019-04-04 NOTE — PROGRESS NOTES
Chief Complaint   Patient presents with    Fatigue       HPI:     Krysten Mosqueda is a 71 y.o.  female with history of dyslipidemia and vitamin D deficiency   here for the above complaint. She is tired in the mornings. She said she slept all night. She was at a stop light and was so tired and fell asleep. She has been a vegetarian and now she is doing glutein free diet in addition to being a vegetarian. She does not eat eggs or dairy. This has been going on for 4 weeks. This is a week after she started glutein fee diet. She denies any chest pain, shortness of breath, abdominal pain, headaches or dizziness. She said since going glutein free, her stools have been formed and not constipated anymore. No snoring, apenic spells, pica, constipation, diarrhea, hot/cold intolerance, oily/dry skin and hair. She was having vaginal spotting, but has gone away. Told her if spotting occurs again to let us know ASAP. She has had a TAHBSO. Past Medical History:   Diagnosis Date    Breast cancer [facility]) 2004    left, Dr. Michelle Pepe [facility])     left breast cancer    Fibroid uterus     Laceration of skin of forearm, left, initial encounter 9/7/2018    Other and unspecified hyperlipidemia 9/2014    Vitamin D deficiency 9/2014     Past Surgical History:   Procedure Laterality Date    BREAST SURGERY PROCEDURE UNLISTED  2004    left breast mastectomy, no LN    COLONOSCOPY N/A 7/6/2017    COLONOSCOPY performed by Tiana Haji MD at [address] [place] [address] OTHER SURGICAL  5/2015    repair on retina hole by Dr. Darby Knows     Current Outpatient Medications   Medication Sig    Calcium-Cholecalciferol, D3, 600 mg(1,500mg) -400 unit cap Take  by mouth. Take one po bid   Indications: new suppliment with calcium, vit d, and zinc     No current facility-administered medications for this visit.       Health Maintenance   Topic Date Due    DTaP/Tdap/Td series (1 - Tdap) 11/20/1997    Shingrix Vaccine Age 50> (1 of 2) 02/10/2000    GLAUCOMA SCREENING Q2Y  12/27/2018    MEDICARE YEARLY EXAM  07/07/2019    Influenza Age 9 to Adult  08/01/2019    BREAST CANCER SCRN MAMMOGRAM  08/03/2020    COLONOSCOPY  07/06/2022    Bone Densitometry (Dexa) Screening  Completed    Pneumococcal 65+ years  Completed    Hepatitis C Screening  Addressed     Immunization History   Administered Date(s) Administered    Hep B Vaccine 09/08/1997, 11/13/1997, 03/23/1998    IPV 06/09/2003    MMR 06/09/2003    Pneumococcal Conjugate (PCV-13) 05/17/2017    Pneumococcal Polysaccharide (PPSV-23) 03/09/2016    Td 11/19/1997    Zoster Vaccine, Live 08/02/2016     No LMP recorded. Patient is postmenopausal.        Allergies and Intolerances:   No Known Allergies    Family History:   Family History   Problem Relation Age of Onset    Hypertension Mother     Diabetes Father     Diabetes Maternal Grandmother        Social History:   She  reports that she has never smoked. She has never used smokeless tobacco.  She  reports that she does not drink alcohol. ·     OBJECTIVE:   Physical exam:   Visit Vitals  /87 (BP 1 Location: Right arm, BP Patient Position: Sitting)   Pulse 63   Temp 97.9 °F (36.6 °C) (Oral)   Resp 16   Ht 5' 4\" (1.626 m)   Wt 132 lb (59.9 kg)   SpO2 97% Comment: on room air   BMI 22.66 kg/m²        Generally: Pleasant female in no acute distress  Neck: no thyromegaly  Cardiac Exam: regular, rate, and rhythm. Normal S1 and S2. No murmurs, gallops, or rubs  Pulmonary exam: Clear to auscultation bilaterally  Abdominal exam: Positive bowel sounds in all four quadrants, soft, nondistended, nontender  Extremities: 2+ dorsalis pedis pulses bilaterally.  No pedal edema    bilaterally    LABS/RADIOLOGICAL TESTS:  Lab Results   Component Value Date/Time    WBC 4.8 04/20/2017 09:58 AM    HGB 12.3 04/20/2017 09:58 AM    HCT 37.5 04/20/2017 09:58 AM    PLATELET 299 84/43/0558 09:58 AM Lab Results   Component Value Date/Time    Sodium 144 2017 09:58 AM    Potassium 4.4 2017 09:58 AM    Chloride 104 2017 09:58 AM    CO2 27 2017 09:58 AM    Glucose 100 (H) 2017 09:58 AM    BUN 12 2017 09:58 AM    Creatinine 0.70 2017 09:58 AM     Lab Results   Component Value Date/Time    Cholesterol, total 191 2015 09:46 AM    HDL Cholesterol 50 2015 09:46 AM    LDL, calculated 129 (H) 2015 09:46 AM    Triglyceride 61 2015 09:46 AM     No results found for: GPT    Previous labs    ASSESSMENT/PLAN:    1. Fatigue, unspecified type  -     TSH 3RD GENERATION; Future  -     CBC W/O DIFF; Future  -     METABOLIC PANEL, COMPREHENSIVE; Future  -     VITAMIN D, 25 HYDROXY; Future  -     VITAMIN B12; Future  -     FERRITIN; Future  -     IRON PROFILE; Future  -     TRANSFERRIN; Future      2. Patient verbalized understanding and agreement with the plan. 3. Patient was given an after-visit summary. 4.   Follow-up and Dispositions    · Return if symptoms worsen or fail to improve  or sooner if worsening symptoms.                Herold Apgar, M.D.

## 2019-04-05 LAB
25(OH)D3+25(OH)D2 SERPL-MCNC: 23.8 NG/ML (ref 30–100)
ALBUMIN SERPL-MCNC: 3.9 G/DL (ref 3.6–4.8)
ALBUMIN/GLOB SERPL: 1.4 {RATIO} (ref 1.2–2.2)
ALP SERPL-CCNC: 78 IU/L (ref 39–117)
ALT SERPL-CCNC: 13 IU/L (ref 0–32)
AST SERPL-CCNC: 18 IU/L (ref 0–40)
BILIRUB SERPL-MCNC: 0.6 MG/DL (ref 0–1.2)
BUN SERPL-MCNC: 10 MG/DL (ref 8–27)
BUN/CREAT SERPL: 12 (ref 12–28)
CALCIUM SERPL-MCNC: 9.4 MG/DL (ref 8.7–10.3)
CHLORIDE SERPL-SCNC: 106 MMOL/L (ref 96–106)
CO2 SERPL-SCNC: 25 MMOL/L (ref 20–29)
CREAT SERPL-MCNC: 0.83 MG/DL (ref 0.57–1)
ERYTHROCYTE [DISTWIDTH] IN BLOOD BY AUTOMATED COUNT: 15.7 % (ref 12.3–15.4)
FERRITIN SERPL-MCNC: 96 NG/ML (ref 15–150)
GLOBULIN SER CALC-MCNC: 2.8 G/DL (ref 1.5–4.5)
GLUCOSE SERPL-MCNC: 87 MG/DL (ref 65–99)
HCT VFR BLD AUTO: 39 % (ref 34–46.6)
HGB BLD-MCNC: 12.9 G/DL (ref 11.1–15.9)
IRON SATN MFR SERPL: 19 % (ref 15–55)
IRON SERPL-MCNC: 56 UG/DL (ref 27–139)
MCH RBC QN AUTO: 26.1 PG (ref 26.6–33)
MCHC RBC AUTO-ENTMCNC: 33.1 G/DL (ref 31.5–35.7)
MCV RBC AUTO: 79 FL (ref 79–97)
PLATELET # BLD AUTO: 237 X10E3/UL (ref 150–379)
POTASSIUM SERPL-SCNC: 4 MMOL/L (ref 3.5–5.2)
PROT SERPL-MCNC: 6.7 G/DL (ref 6–8.5)
RBC # BLD AUTO: 4.94 X10E6/UL (ref 3.77–5.28)
SODIUM SERPL-SCNC: 146 MMOL/L (ref 134–144)
TIBC SERPL-MCNC: 291 UG/DL (ref 250–450)
TRANSFERRIN SERPL-MCNC: 246 MG/DL (ref 200–370)
TSH SERPL DL<=0.005 MIU/L-ACNC: 1.53 UIU/ML (ref 0.45–4.5)
UIBC SERPL-MCNC: 235 UG/DL (ref 118–369)
VIT B12 SERPL-MCNC: 856 PG/ML (ref 232–1245)
WBC # BLD AUTO: 4.4 X10E3/UL (ref 3.4–10.8)

## 2019-08-07 DIAGNOSIS — Z12.39 BREAST CANCER SCREENING: Primary | ICD-10-CM

## 2019-08-21 DIAGNOSIS — Z12.39 BREAST CANCER SCREENING: ICD-10-CM

## 2019-09-24 PROBLEM — Z00.00 MEDICARE ANNUAL WELLNESS VISIT, SUBSEQUENT: Status: RESOLVED | Noted: 2018-07-06 | Resolved: 2019-09-24
